# Patient Record
Sex: FEMALE | Race: WHITE | NOT HISPANIC OR LATINO
[De-identification: names, ages, dates, MRNs, and addresses within clinical notes are randomized per-mention and may not be internally consistent; named-entity substitution may affect disease eponyms.]

---

## 2020-08-10 ENCOUNTER — FORM ENCOUNTER (OUTPATIENT)
Age: 30
End: 2020-08-10

## 2020-08-16 ENCOUNTER — FORM ENCOUNTER (OUTPATIENT)
Age: 30
End: 2020-08-16

## 2020-08-16 ENCOUNTER — RESULT REVIEW (OUTPATIENT)
Age: 30
End: 2020-08-16

## 2020-08-17 ENCOUNTER — FORM ENCOUNTER (OUTPATIENT)
Age: 30
End: 2020-08-17

## 2020-08-17 ENCOUNTER — APPOINTMENT (OUTPATIENT)
Dept: OBGYN | Facility: CLINIC | Age: 30
End: 2020-08-17
Payer: COMMERCIAL

## 2020-08-17 ENCOUNTER — TRANSCRIPTION ENCOUNTER (OUTPATIENT)
Age: 30
End: 2020-08-17

## 2020-08-17 PROCEDURE — 76813 OB US NUCHAL MEAS 1 GEST: CPT

## 2020-08-17 PROCEDURE — 76801 OB US < 14 WKS SINGLE FETUS: CPT | Mod: 59

## 2020-08-17 PROCEDURE — 36415 COLL VENOUS BLD VENIPUNCTURE: CPT

## 2020-08-17 PROCEDURE — 0502F SUBSEQUENT PRENATAL CARE: CPT

## 2020-09-09 ENCOUNTER — APPOINTMENT (OUTPATIENT)
Dept: OBGYN | Facility: CLINIC | Age: 30
End: 2020-09-09

## 2020-09-09 ENCOUNTER — APPOINTMENT (OUTPATIENT)
Dept: OBGYN | Facility: CLINIC | Age: 30
End: 2020-09-09
Payer: COMMERCIAL

## 2020-09-09 PROCEDURE — 99213 OFFICE O/P EST LOW 20 MIN: CPT

## 2020-09-23 ENCOUNTER — APPOINTMENT (OUTPATIENT)
Dept: OBGYN | Facility: CLINIC | Age: 30
End: 2020-09-23
Payer: COMMERCIAL

## 2020-09-23 PROCEDURE — 0502F SUBSEQUENT PRENATAL CARE: CPT

## 2020-09-23 PROCEDURE — 36415 COLL VENOUS BLD VENIPUNCTURE: CPT

## 2020-09-28 ENCOUNTER — FORM ENCOUNTER (OUTPATIENT)
Age: 30
End: 2020-09-28

## 2020-10-08 ENCOUNTER — OUTPATIENT (OUTPATIENT)
Dept: OUTPATIENT SERVICES | Age: 30
LOS: 1 days | Discharge: HOME | End: 2020-10-08

## 2020-10-14 ENCOUNTER — APPOINTMENT (OUTPATIENT)
Dept: ANTEPARTUM | Facility: CLINIC | Age: 30
End: 2020-10-14
Payer: COMMERCIAL

## 2020-10-14 ENCOUNTER — ASOB RESULT (OUTPATIENT)
Age: 30
End: 2020-10-14

## 2020-10-14 ENCOUNTER — APPOINTMENT (OUTPATIENT)
Dept: OBGYN | Facility: CLINIC | Age: 30
End: 2020-10-14
Payer: COMMERCIAL

## 2020-10-14 PROBLEM — Z00.00 ENCOUNTER FOR PREVENTIVE HEALTH EXAMINATION: Status: ACTIVE | Noted: 2020-10-14

## 2020-10-14 PROCEDURE — 0502F SUBSEQUENT PRENATAL CARE: CPT

## 2020-10-14 PROCEDURE — 76811 OB US DETAILED SNGL FETUS: CPT

## 2020-10-14 PROCEDURE — 76817 TRANSVAGINAL US OBSTETRIC: CPT | Mod: 59

## 2020-10-15 ENCOUNTER — FORM ENCOUNTER (OUTPATIENT)
Age: 30
End: 2020-10-15

## 2020-10-16 ENCOUNTER — APPOINTMENT (OUTPATIENT)
Dept: PEDIATRIC CARDIOLOGY | Facility: CLINIC | Age: 30
End: 2020-10-16
Payer: COMMERCIAL

## 2020-10-16 PROCEDURE — 93325 DOPPLER ECHO COLOR FLOW MAPG: CPT

## 2020-10-16 PROCEDURE — 76825 ECHO EXAM OF FETAL HEART: CPT

## 2020-10-16 PROCEDURE — 76827 ECHO EXAM OF FETAL HEART: CPT

## 2020-11-04 ENCOUNTER — APPOINTMENT (OUTPATIENT)
Dept: OBGYN | Facility: CLINIC | Age: 30
End: 2020-11-04

## 2020-11-18 ENCOUNTER — APPOINTMENT (OUTPATIENT)
Dept: OBGYN | Facility: CLINIC | Age: 30
End: 2020-11-18
Payer: COMMERCIAL

## 2020-11-18 ENCOUNTER — APPOINTMENT (OUTPATIENT)
Dept: OBGYN | Facility: CLINIC | Age: 30
End: 2020-11-18

## 2020-11-18 PROCEDURE — 76817 TRANSVAGINAL US OBSTETRIC: CPT

## 2020-11-18 PROCEDURE — 76815 OB US LIMITED FETUS(S): CPT

## 2020-11-19 ENCOUNTER — TRANSCRIPTION ENCOUNTER (OUTPATIENT)
Age: 30
End: 2020-11-19

## 2020-11-19 ENCOUNTER — INPATIENT (INPATIENT)
Facility: HOSPITAL | Age: 30
LOS: 2 days | Discharge: HOME | DRG: 832 | End: 2020-11-22
Attending: OBSTETRICS & GYNECOLOGY | Admitting: OBSTETRICS & GYNECOLOGY
Payer: COMMERCIAL

## 2020-11-19 ENCOUNTER — RESULT REVIEW (OUTPATIENT)
Age: 30
End: 2020-11-19

## 2020-11-19 VITALS
SYSTOLIC BLOOD PRESSURE: 107 MMHG | OXYGEN SATURATION: 100 % | RESPIRATION RATE: 17 BRPM | DIASTOLIC BLOOD PRESSURE: 67 MMHG | HEART RATE: 91 BPM | TEMPERATURE: 99 F

## 2020-11-19 DIAGNOSIS — N20.0 CALCULUS OF KIDNEY: ICD-10-CM

## 2020-11-19 DIAGNOSIS — Z34.80 ENCOUNTER FOR SUPERVISION OF OTHER NORMAL PREGNANCY, UNSPECIFIED TRIMESTER: ICD-10-CM

## 2020-11-19 DIAGNOSIS — Z3A.00 WEEKS OF GESTATION OF PREGNANCY NOT SPECIFIED: ICD-10-CM

## 2020-11-19 DIAGNOSIS — O26.899 OTHER SPECIFIED PREGNANCY RELATED CONDITIONS, UNSPECIFIED TRIMESTER: ICD-10-CM

## 2020-11-19 LAB
ANION GAP SERPL CALC-SCNC: 12 MMOL/L — SIGNIFICANT CHANGE UP (ref 5–17)
APPEARANCE UR: ABNORMAL
BASOPHILS # BLD AUTO: 0.02 K/UL — SIGNIFICANT CHANGE UP (ref 0–0.2)
BASOPHILS NFR BLD AUTO: 0.2 % — SIGNIFICANT CHANGE UP (ref 0–2)
BILIRUB UR-MCNC: NEGATIVE — SIGNIFICANT CHANGE UP
BLD GP AB SCN SERPL QL: NEGATIVE — SIGNIFICANT CHANGE UP
BUN SERPL-MCNC: 12 MG/DL — SIGNIFICANT CHANGE UP (ref 7–23)
CALCIUM SERPL-MCNC: 9.1 MG/DL — SIGNIFICANT CHANGE UP (ref 8.4–10.5)
CHLORIDE SERPL-SCNC: 102 MMOL/L — SIGNIFICANT CHANGE UP (ref 96–108)
CO2 SERPL-SCNC: 21 MMOL/L — LOW (ref 22–31)
COLOR SPEC: YELLOW — SIGNIFICANT CHANGE UP
CREAT SERPL-MCNC: 0.55 MG/DL — SIGNIFICANT CHANGE UP (ref 0.5–1.3)
DIFF PNL FLD: ABNORMAL
EOSINOPHIL # BLD AUTO: 0.02 K/UL — SIGNIFICANT CHANGE UP (ref 0–0.5)
EOSINOPHIL NFR BLD AUTO: 0.2 % — SIGNIFICANT CHANGE UP (ref 0–6)
GLUCOSE SERPL-MCNC: 102 MG/DL — HIGH (ref 70–99)
GLUCOSE UR QL: NEGATIVE — SIGNIFICANT CHANGE UP
HCT VFR BLD CALC: 34.1 % — LOW (ref 34.5–45)
HGB BLD-MCNC: 11.5 G/DL — SIGNIFICANT CHANGE UP (ref 11.5–15.5)
IMM GRANULOCYTES NFR BLD AUTO: 0.4 % — SIGNIFICANT CHANGE UP (ref 0–1.5)
KETONES UR-MCNC: ABNORMAL
LEUKOCYTE ESTERASE UR-ACNC: NEGATIVE — SIGNIFICANT CHANGE UP
LYMPHOCYTES # BLD AUTO: 0.83 K/UL — LOW (ref 1–3.3)
LYMPHOCYTES # BLD AUTO: 8.1 % — LOW (ref 13–44)
MCHC RBC-ENTMCNC: 33.2 PG — SIGNIFICANT CHANGE UP (ref 27–34)
MCHC RBC-ENTMCNC: 33.7 GM/DL — SIGNIFICANT CHANGE UP (ref 32–36)
MCV RBC AUTO: 98.6 FL — SIGNIFICANT CHANGE UP (ref 80–100)
MONOCYTES # BLD AUTO: 0.59 K/UL — SIGNIFICANT CHANGE UP (ref 0–0.9)
MONOCYTES NFR BLD AUTO: 5.7 % — SIGNIFICANT CHANGE UP (ref 2–14)
NEUTROPHILS # BLD AUTO: 8.81 K/UL — HIGH (ref 1.8–7.4)
NEUTROPHILS NFR BLD AUTO: 85.4 % — HIGH (ref 43–77)
NITRITE UR-MCNC: NEGATIVE — SIGNIFICANT CHANGE UP
NRBC # BLD: 0 /100 WBCS — SIGNIFICANT CHANGE UP (ref 0–0)
PH UR: 6 — SIGNIFICANT CHANGE UP (ref 5–8)
PLATELET # BLD AUTO: 250 K/UL — SIGNIFICANT CHANGE UP (ref 150–400)
POTASSIUM SERPL-MCNC: 3.9 MMOL/L — SIGNIFICANT CHANGE UP (ref 3.5–5.3)
POTASSIUM SERPL-SCNC: 3.9 MMOL/L — SIGNIFICANT CHANGE UP (ref 3.5–5.3)
PROT UR-MCNC: ABNORMAL
RBC # BLD: 3.46 M/UL — LOW (ref 3.8–5.2)
RBC # FLD: 12.9 % — SIGNIFICANT CHANGE UP (ref 10.3–14.5)
RH IG SCN BLD-IMP: POSITIVE — SIGNIFICANT CHANGE UP
SARS-COV-2 RNA SPEC QL NAA+PROBE: SIGNIFICANT CHANGE UP
SODIUM SERPL-SCNC: 135 MMOL/L — SIGNIFICANT CHANGE UP (ref 135–145)
SP GR SPEC: 1.03 — HIGH (ref 1.01–1.02)
UROBILINOGEN FLD QL: NEGATIVE — SIGNIFICANT CHANGE UP
WBC # BLD: 10.31 K/UL — SIGNIFICANT CHANGE UP (ref 3.8–10.5)
WBC # FLD AUTO: 10.31 K/UL — SIGNIFICANT CHANGE UP (ref 3.8–10.5)

## 2020-11-19 PROCEDURE — 99222 1ST HOSP IP/OBS MODERATE 55: CPT

## 2020-11-19 PROCEDURE — 76770 US EXAM ABDO BACK WALL COMP: CPT | Mod: 26

## 2020-11-19 RX ORDER — MORPHINE SULFATE 50 MG/1
2 CAPSULE, EXTENDED RELEASE ORAL ONCE
Refills: 0 | Status: DISCONTINUED | OUTPATIENT
Start: 2020-11-19 | End: 2020-11-19

## 2020-11-19 RX ORDER — ONDANSETRON 8 MG/1
4 TABLET, FILM COATED ORAL EVERY 6 HOURS
Refills: 0 | Status: DISCONTINUED | OUTPATIENT
Start: 2020-11-19 | End: 2020-11-22

## 2020-11-19 RX ORDER — OXYCODONE HYDROCHLORIDE 5 MG/1
5 TABLET ORAL EVERY 4 HOURS
Refills: 0 | Status: DISCONTINUED | OUTPATIENT
Start: 2020-11-19 | End: 2020-11-20

## 2020-11-19 RX ORDER — FAMOTIDINE 10 MG/ML
20 INJECTION INTRAVENOUS ONCE
Refills: 0 | Status: COMPLETED | OUTPATIENT
Start: 2020-11-19 | End: 2020-11-19

## 2020-11-19 RX ORDER — INFLUENZA VIRUS VACCINE 15; 15; 15; 15 UG/.5ML; UG/.5ML; UG/.5ML; UG/.5ML
0.5 SUSPENSION INTRAMUSCULAR ONCE
Refills: 0 | Status: DISCONTINUED | OUTPATIENT
Start: 2020-11-19 | End: 2020-11-22

## 2020-11-19 RX ORDER — ACETAMINOPHEN 500 MG
1000 TABLET ORAL ONCE
Refills: 0 | Status: COMPLETED | OUTPATIENT
Start: 2020-11-19 | End: 2020-11-19

## 2020-11-19 RX ORDER — HYDROMORPHONE HYDROCHLORIDE 2 MG/ML
0.5 INJECTION INTRAMUSCULAR; INTRAVENOUS; SUBCUTANEOUS ONCE
Refills: 0 | Status: DISCONTINUED | OUTPATIENT
Start: 2020-11-19 | End: 2020-11-19

## 2020-11-19 RX ORDER — SODIUM CHLORIDE 9 MG/ML
1000 INJECTION, SOLUTION INTRAVENOUS
Refills: 0 | Status: DISCONTINUED | OUTPATIENT
Start: 2020-11-19 | End: 2020-11-22

## 2020-11-19 RX ORDER — SODIUM CHLORIDE 9 MG/ML
1000 INJECTION, SOLUTION INTRAVENOUS
Refills: 0 | Status: DISCONTINUED | OUTPATIENT
Start: 2020-11-19 | End: 2020-11-19

## 2020-11-19 RX ORDER — SODIUM CHLORIDE 9 MG/ML
1000 INJECTION, SOLUTION INTRAVENOUS ONCE
Refills: 0 | Status: COMPLETED | OUTPATIENT
Start: 2020-11-19 | End: 2020-11-19

## 2020-11-19 RX ADMIN — OXYCODONE HYDROCHLORIDE 5 MILLIGRAM(S): 5 TABLET ORAL at 21:03

## 2020-11-19 RX ADMIN — FAMOTIDINE 20 MILLIGRAM(S): 10 INJECTION INTRAVENOUS at 15:35

## 2020-11-19 RX ADMIN — SODIUM CHLORIDE 2000 MILLILITER(S): 9 INJECTION, SOLUTION INTRAVENOUS at 07:09

## 2020-11-19 RX ADMIN — SODIUM CHLORIDE 1000 MILLILITER(S): 9 INJECTION, SOLUTION INTRAVENOUS at 04:00

## 2020-11-19 RX ADMIN — ONDANSETRON 4 MILLIGRAM(S): 8 TABLET, FILM COATED ORAL at 20:33

## 2020-11-19 RX ADMIN — Medication 400 MILLIGRAM(S): at 10:47

## 2020-11-19 RX ADMIN — SODIUM CHLORIDE 250 MILLILITER(S): 9 INJECTION, SOLUTION INTRAVENOUS at 08:03

## 2020-11-19 RX ADMIN — ONDANSETRON 4 MILLIGRAM(S): 8 TABLET, FILM COATED ORAL at 11:31

## 2020-11-19 RX ADMIN — MORPHINE SULFATE 2 MILLIGRAM(S): 50 CAPSULE, EXTENDED RELEASE ORAL at 08:18

## 2020-11-19 RX ADMIN — MORPHINE SULFATE 2 MILLIGRAM(S): 50 CAPSULE, EXTENDED RELEASE ORAL at 15:37

## 2020-11-19 RX ADMIN — MORPHINE SULFATE 2 MILLIGRAM(S): 50 CAPSULE, EXTENDED RELEASE ORAL at 08:04

## 2020-11-19 RX ADMIN — HYDROMORPHONE HYDROCHLORIDE 0.5 MILLIGRAM(S): 2 INJECTION INTRAMUSCULAR; INTRAVENOUS; SUBCUTANEOUS at 22:17

## 2020-11-19 RX ADMIN — HYDROMORPHONE HYDROCHLORIDE 0.5 MILLIGRAM(S): 2 INJECTION INTRAMUSCULAR; INTRAVENOUS; SUBCUTANEOUS at 22:47

## 2020-11-19 RX ADMIN — MORPHINE SULFATE 2 MILLIGRAM(S): 50 CAPSULE, EXTENDED RELEASE ORAL at 15:35

## 2020-11-19 RX ADMIN — MORPHINE SULFATE 2 MILLIGRAM(S): 50 CAPSULE, EXTENDED RELEASE ORAL at 16:30

## 2020-11-19 RX ADMIN — Medication 400 MILLIGRAM(S): at 04:17

## 2020-11-19 RX ADMIN — OXYCODONE HYDROCHLORIDE 5 MILLIGRAM(S): 5 TABLET ORAL at 21:35

## 2020-11-19 RX ADMIN — SODIUM CHLORIDE 250 MILLILITER(S): 9 INJECTION, SOLUTION INTRAVENOUS at 21:37

## 2020-11-19 RX ADMIN — OXYCODONE HYDROCHLORIDE 5 MILLIGRAM(S): 5 TABLET ORAL at 20:33

## 2020-11-19 RX ADMIN — MORPHINE SULFATE 2 MILLIGRAM(S): 50 CAPSULE, EXTENDED RELEASE ORAL at 14:38

## 2020-11-19 RX ADMIN — Medication 1000 MILLIGRAM(S): at 18:55

## 2020-11-19 RX ADMIN — Medication 1000 MILLIGRAM(S): at 11:15

## 2020-11-19 RX ADMIN — Medication 400 MILLIGRAM(S): at 18:06

## 2020-11-19 RX ADMIN — OXYCODONE HYDROCHLORIDE 5 MILLIGRAM(S): 5 TABLET ORAL at 22:05

## 2020-11-19 NOTE — DISCHARGE NOTE ANTEPARTUM - MEDICATION SUMMARY - MEDICATIONS TO TAKE
I will START or STAY ON the medications listed below when I get home from the hospital:    acetaminophen 325 mg oral tablet  -- 2 tab(s) by mouth every 6 hours   for moderate pain   -- Indication: For Nephrolithiasis    oxyCODONE 5 mg oral tablet  -- 1 tab(s) by mouth every 4 hours, As Needed for Severe Pain MDD:2  -- Caution federal law prohibits the transfer of this drug to any person other  than the person for whom it was prescribed.  It is very important that you take or use this exactly as directed.  Do not skip doses or discontinue unless directed by your doctor.  May cause drowsiness or dizziness.  This prescription cannot be refilled.  Using more of this medication than prescribed may cause serious breathing problems.    -- Indication: For Nephrolithiasis    senna 8.6 mg oral tablet  -- 1 tab(s) by mouth 2 times a day for constipation, hold for loose stools   -- Indication: For Pregnancy

## 2020-11-19 NOTE — DISCHARGE NOTE ANTEPARTUM - PATIENT PORTAL LINK FT
You can access the FollowMyHealth Patient Portal offered by [facility] by registering at the following website: [email or web address]. By joining [facility]’s FollowMyHealth portal, you will also be able to view your health information using other applications (apps) compatible with our system.

## 2020-11-19 NOTE — OB PROVIDER TRIAGE NOTE - HISTORY OF PRESENT ILLNESS
R3 OB H+P    EDITA MARTINO    30y  at 25w presents with constant pelvic pain x 1d. Patient was seen in the office today--CL 3cm and cervix CLH. Pain resolved and then recurred at 1030p tonight. Pain is constant, sharp, worse with urination.   Patient endorses three bowel movements this evening with the last being watery.  No fevers or chills. No palpitations, chest pain, or SOB.  No vaginal bleeding, loss of fluid. Reports good fetal movement.     PNC: uncomplicated  ObH: current  GynH: No fibroids, endometriosis, STIs, or abn pap. +PCOS  PMH: denies  PSH: denies  Meds: PNV  All: No Known Allergies  SocH: No t/e/d.   PsychH: No h/o anxiety/depression. No PP depression    REVIEW OF SYSTEMS      General: Denies fever, chills, weakness	    Skin/Breast: Denies breast pain  	  Respiratory and Thorax: Denies SOB, denies cough  	  Cardiovascular: Denies chest pain or palpitations    Gastrointestinal: Denies nausea/vomiting.     Genitourinary: Denies increased urinary frequency, urgency	    Constitutional, Cardiovascular, Respiratory, Gastrointestinal, Genitourinary, Musculoskeletal, Neurological, and Integumentary review of systems are normal except as noted     PE    T(C): 37.0 (20 @ 02:50), Max: 37 (20 @ 02:46)  HR: 90 (20 @ 03:42) (89 - 97)  BP: 107/67 (20 @ 02:50) (107/67 - 107/67)  RR: 17 (20 @ 02:50) (17 - 17)  SpO2: 99% (20 @ 03:42) (99% - 100%)    Gen: NAD  CV: RRR  Resp: CTABL  Back: Mild RCVAT, also TTP over R costal border  Abd: soft, gravid. +suprapubic tenderness  Ext: bl symmetric, nontender  SVE: CLH  TVUS: CL 4.2-4.3  EFM: 145/mod variability/+accels/-decels  TOCO: +irritability

## 2020-11-19 NOTE — OB PROVIDER TRIAGE NOTE - NSOBPROVIDERNOTE_OBGYN_ALL_OB_FT
A/P 30y  at 25w with abdominal pain. Low suspicion for PTL given long cervix and no change in cervical exam. Will fluid resuscitate, treat pain, and assess for UTI.     - UA/UCx  - 1L LR  - CBC/BMP  - COVID PCR    Kayleigh Cheung PGY3

## 2020-11-19 NOTE — DISCHARGE NOTE ANTEPARTUM - CARE PLAN
Principal Discharge DX:	Nephrolithiasis  Goal:	Well being  Assessment and plan of treatment:	Please follow up with your OB doctor this week.  Return to L&D if you experience contractions every 3-5 minutes, leaking of fluid, vaginal bleeding like a period, or less than 5 fetal movements per hour.   Principal Discharge DX:	Nephrolithiasis  Goal:	Well being  Assessment and plan of treatment:	Please follow up with your OB doctor this week.  Return to L&D if you experience contractions every 3-5 minutes, leaking of fluid, vaginal bleeding like a period, or less than 5 fetal movements per hour.  You may take Tylenol for mild-moderate pain. You may take Oxycodone as needed for severe pain  Goal:	Prenatal care  Assessment and plan of treatment:	Continue your prenatal vitamins  Call the office if you experience painful contractions, leakage of fluid, or vaginal bleeding

## 2020-11-19 NOTE — OB PROVIDER H&P - ASSESSMENT
A/P 30y  at 25w with R flank pain with radiation to RLQ. Initial relief with Tylenol, now with worsening flank pain rated 9/10. Low suspicion for PTL given long cervix and no change in cervical exam. UA with trace blood and moderate calcium oxalate crystals. Renal sono with mild to moderate R hydronephrosis, right ureteral jet not visualized. Clinical picture suspicious for nephrolithiasis. Low concern for infected stone given pt is afebrile with WBC 10. Will admit to antepartum for pain management and IV hydration.    #Nephrolithiasis   -s/p 2L LR, mIVF@250  -Strain all voids  -Morphine 2mg for pain, reevaluate as needed     #Fetal Well Being  -Cat 1 tracing, fetal status reassuring  -Continuous monitoring during 1st dose of morphine. If tracing remains Cat 1, will transition to NST BID    #Maternal well-being  -Diet as tolerated  -Continue to monitor VS  -Continue PNV  -SCDs for DVT ppx       D./w Dr. Jaden madrid pgy3

## 2020-11-19 NOTE — DISCHARGE NOTE ANTEPARTUM - HOSPITAL COURSE
Patient was admitted with R flank pain, suspicious for nephrolithiasis. Renal ultrasound should mild to moderate right hydronephrosis unable to visualized R ureteral jet. Patient was admitted for pain management and fluid hydration. Pt was discharged on HD*** with excellent pain control. Fetal status reassuring Ms Green is a 31yo  at 25w3d EGA admitted with suspected nephrolithiasis. Workup notable for bilateral mild L and mild-moderate R hydronephrosis cw physiologic changes in pregnancy. CT A/P notable for R calyceal rupture, appreciate Urology recs and likely related to recent stone passage. Patient continues to improve with primary goal now - pain management. Pt hypotensive and tachycardic on () likely 2/2 narcotic use, now resolved. EKG with sinus tachycardia. No longer having palpitations. With regards to her clinical course, patient has been r/o for pyelonephritis, ovarian torsion, and appendicitis. She continues to improve. NST reactive with reassuring fetal status. Patient stable for safe discharge home.

## 2020-11-19 NOTE — DISCHARGE NOTE ANTEPARTUM - PLAN OF CARE
Well being Please follow up with your OB doctor this week.  Return to L&D if you experience contractions every 3-5 minutes, leaking of fluid, vaginal bleeding like a period, or less than 5 fetal movements per hour. Please follow up with your OB doctor this week.  Return to L&D if you experience contractions every 3-5 minutes, leaking of fluid, vaginal bleeding like a period, or less than 5 fetal movements per hour.  You may take Tylenol for mild-moderate pain. You may take Oxycodone as needed for severe pain Prenatal care Continue your prenatal vitamins  Call the office if you experience painful contractions, leakage of fluid, or vaginal bleeding

## 2020-11-19 NOTE — OB PROVIDER H&P - NSHPLABSRESULTS_GEN_ALL_CORE
Urinalysis (11.19.20 @ 04:21)   pH Urine: 6.0   Glucose Qualitative, Urine: Negative   Blood, Urine: Trace   Color: Yellow   Urine Appearance: Slightly Turbid   Bilirubin: Negative   Ketone - Urine: Large   Specific Gravity: 1.029   Protein, Urine: Trace   Urobilinogen: Negative   Nitrite: Negative   Leukocyte Esterase Concentration: Negative     < from: US Kidney and Bladder (11.19.20 @ 06:15) >    EXAM:  US KIDNEYS AND BLADDER                            PROCEDURE DATE:  11/19/2020            INTERPRETATION:  CLINICAL INFORMATION: Flank pain and hematuria. 25 weeks pregnant. Evaluate for hydronephrosis or stone.    COMPARISON: None available.    TECHNIQUE: Sonography of the kidneys and bladder.    FINDINGS:    Slightly limited exam secondary to overlying bowel gas.    Right kidney: 13.8 cm. Mild-moderate hydronephrosis. No renal calculi or renal mass identified.    Left kidney: 12.0 cm. Mild hydronephrosis. No renal calculi or renal mass identified.    Urinary bladder: Within normal limits. The left ureteral jet is visualized. The right ureteral jet was not visualized.    IMPRESSION:    Limited exam.    Mild is moderate right hydronephrosis and mild left hydronephrosis. No renal calculi identified, however the right ureteral jet is not visualized.    < end of copied text >

## 2020-11-19 NOTE — DISCHARGE NOTE ANTEPARTUM - CARE PROVIDER_API CALL
Priyanka Ayala  MATERNAL/FETAL MEDICINE  [address] [place] [address], [facility] [address]  [place] [address]  Phone: [phone]  Fax: [phone]  Follow Up Time:

## 2020-11-19 NOTE — OB PROVIDER H&P - NSHPPHYSICALEXAM_GEN_ALL_CORE
Gen: NAD  CV: RRR  Resp: CTABL  Back: Mild RCVAT, also TTP over R costal border  Abd: soft, gravid. +suprapubic tenderness  Ext: bl symmetric, nontender  SVE: CLH  TVUS: CL 4.2-4.3  EFM: 145/mod variability/+accels/-decels  TOCO: acontractile

## 2020-11-19 NOTE — OB PROVIDER H&P - HISTORY OF PRESENT ILLNESS
R3 OB H+P    EDITA MARTINO  30y  at 25w presents with constant pelvic pain x 1d. Patient was seen in the office today--CL 3cm and cervix CLH. Pain resolved and then recurred at 1030p tonight. Pain is constant, sharp, worse with urination.   Patient endorses three bowel movements this evening with the last being watery.  No fevers or chills. No palpitations, chest pain, or SOB.  No vaginal bleeding, loss of fluid. Reports good fetal movement.     Pt reevaluated at 730am. Received Tylenol at 4am with good relief. Pt now reports 9/10 right flank pain. Unable to sit still. Pt reports pain radiates to RLQ. Denies nausea/vomiting.       PNC: uncomplicated  ObH: current  GynH: No fibroids, endometriosis, STIs, or abn pap. +PCOS  PMH: denies  PSH: denies  Meds: PNV  All: No Known Allergies  SocH: No t/e/d.   PsychH: No h/o anxiety/depression. No PP depression    REVIEW OF SYSTEMS      General: Denies fever, chills, weakness	    Skin/Breast: Denies breast pain  	  Respiratory and Thorax: Denies SOB, denies cough  	  Cardiovascular: Denies chest pain or palpitations    Gastrointestinal: Denies nausea/vomiting.     Genitourinary: Denies increased urinary frequency, urgency	    Constitutional, Cardiovascular, Respiratory, Gastrointestinal, Genitourinary, Musculoskeletal, Neurological, and Integumentary review of systems are normal except as noted     PE    T(C): 37.0 (20 @ 02:50), Max: 37 (20 @ 02:46)  HR: 90 (20 @ 03:42) (89 - 97)  BP: 107/67 (20 @ 02:50) (107/67 - 107/67)  RR: 17 (20 @ 02:50) (17 - 17)  SpO2: 99% (20 @ 03:42) (99% - 100%)

## 2020-11-20 LAB
CULTURE RESULTS: SIGNIFICANT CHANGE UP
SARS-COV-2 IGG SERPL QL IA: NEGATIVE — SIGNIFICANT CHANGE UP
SARS-COV-2 IGM SERPL IA-ACNC: <0.1 INDEX — SIGNIFICANT CHANGE UP
SPECIMEN SOURCE: SIGNIFICANT CHANGE UP

## 2020-11-20 PROCEDURE — 99221 1ST HOSP IP/OBS SF/LOW 40: CPT

## 2020-11-20 PROCEDURE — 99233 SBSQ HOSP IP/OBS HIGH 50: CPT

## 2020-11-20 RX ORDER — CALCIUM CARBONATE 500(1250)
2 TABLET ORAL EVERY 6 HOURS
Refills: 0 | Status: DISCONTINUED | OUTPATIENT
Start: 2020-11-20 | End: 2020-11-22

## 2020-11-20 RX ORDER — HYDROMORPHONE HYDROCHLORIDE 2 MG/ML
0.5 INJECTION INTRAMUSCULAR; INTRAVENOUS; SUBCUTANEOUS ONCE
Refills: 0 | Status: DISCONTINUED | OUTPATIENT
Start: 2020-11-20 | End: 2020-11-20

## 2020-11-20 RX ORDER — CEPHALEXIN 500 MG
1 CAPSULE ORAL
Qty: 48 | Refills: 0
Start: 2020-11-20 | End: 2020-12-01

## 2020-11-20 RX ORDER — TAMSULOSIN HYDROCHLORIDE 0.4 MG/1
0.4 CAPSULE ORAL AT BEDTIME
Refills: 0 | Status: DISCONTINUED | OUTPATIENT
Start: 2020-11-20 | End: 2020-11-22

## 2020-11-20 RX ORDER — FAMOTIDINE 10 MG/ML
20 INJECTION INTRAVENOUS DAILY
Refills: 0 | Status: DISCONTINUED | OUTPATIENT
Start: 2020-11-20 | End: 2020-11-22

## 2020-11-20 RX ORDER — HYDROMORPHONE HYDROCHLORIDE 2 MG/ML
2 INJECTION INTRAMUSCULAR; INTRAVENOUS; SUBCUTANEOUS
Refills: 0 | Status: DISCONTINUED | OUTPATIENT
Start: 2020-11-20 | End: 2020-11-22

## 2020-11-20 RX ORDER — ACETAMINOPHEN 500 MG
1000 TABLET ORAL EVERY 6 HOURS
Refills: 0 | Status: DISCONTINUED | OUTPATIENT
Start: 2020-11-20 | End: 2020-11-21

## 2020-11-20 RX ORDER — HYDROMORPHONE HYDROCHLORIDE 2 MG/ML
1 INJECTION INTRAMUSCULAR; INTRAVENOUS; SUBCUTANEOUS EVERY 4 HOURS
Refills: 0 | Status: DISCONTINUED | OUTPATIENT
Start: 2020-11-20 | End: 2020-11-20

## 2020-11-20 RX ORDER — HYDROMORPHONE HYDROCHLORIDE 2 MG/ML
1 INJECTION INTRAMUSCULAR; INTRAVENOUS; SUBCUTANEOUS ONCE
Refills: 0 | Status: DISCONTINUED | OUTPATIENT
Start: 2020-11-20 | End: 2020-11-20

## 2020-11-20 RX ADMIN — ONDANSETRON 4 MILLIGRAM(S): 8 TABLET, FILM COATED ORAL at 01:47

## 2020-11-20 RX ADMIN — HYDROMORPHONE HYDROCHLORIDE 1 MILLIGRAM(S): 2 INJECTION INTRAMUSCULAR; INTRAVENOUS; SUBCUTANEOUS at 10:45

## 2020-11-20 RX ADMIN — HYDROMORPHONE HYDROCHLORIDE 2 MILLIGRAM(S): 2 INJECTION INTRAMUSCULAR; INTRAVENOUS; SUBCUTANEOUS at 14:38

## 2020-11-20 RX ADMIN — FAMOTIDINE 20 MILLIGRAM(S): 10 INJECTION INTRAVENOUS at 18:06

## 2020-11-20 RX ADMIN — HYDROMORPHONE HYDROCHLORIDE 2 MILLIGRAM(S): 2 INJECTION INTRAMUSCULAR; INTRAVENOUS; SUBCUTANEOUS at 15:30

## 2020-11-20 RX ADMIN — HYDROMORPHONE HYDROCHLORIDE 2 MILLIGRAM(S): 2 INJECTION INTRAMUSCULAR; INTRAVENOUS; SUBCUTANEOUS at 18:06

## 2020-11-20 RX ADMIN — HYDROMORPHONE HYDROCHLORIDE 2 MILLIGRAM(S): 2 INJECTION INTRAMUSCULAR; INTRAVENOUS; SUBCUTANEOUS at 19:00

## 2020-11-20 RX ADMIN — Medication 2 TABLET(S): at 15:33

## 2020-11-20 RX ADMIN — HYDROMORPHONE HYDROCHLORIDE 0.5 MILLIGRAM(S): 2 INJECTION INTRAMUSCULAR; INTRAVENOUS; SUBCUTANEOUS at 01:48

## 2020-11-20 RX ADMIN — HYDROMORPHONE HYDROCHLORIDE 1 MILLIGRAM(S): 2 INJECTION INTRAMUSCULAR; INTRAVENOUS; SUBCUTANEOUS at 09:57

## 2020-11-20 RX ADMIN — HYDROMORPHONE HYDROCHLORIDE 0.5 MILLIGRAM(S): 2 INJECTION INTRAMUSCULAR; INTRAVENOUS; SUBCUTANEOUS at 05:54

## 2020-11-20 RX ADMIN — TAMSULOSIN HYDROCHLORIDE 0.4 MILLIGRAM(S): 0.4 CAPSULE ORAL at 21:43

## 2020-11-20 RX ADMIN — HYDROMORPHONE HYDROCHLORIDE 2 MILLIGRAM(S): 2 INJECTION INTRAMUSCULAR; INTRAVENOUS; SUBCUTANEOUS at 21:02

## 2020-11-20 RX ADMIN — HYDROMORPHONE HYDROCHLORIDE 0.5 MILLIGRAM(S): 2 INJECTION INTRAMUSCULAR; INTRAVENOUS; SUBCUTANEOUS at 02:20

## 2020-11-20 RX ADMIN — Medication 2 TABLET(S): at 21:44

## 2020-11-20 RX ADMIN — Medication 1 TABLET(S): at 21:43

## 2020-11-20 NOTE — CONSULT NOTE ADULT - SUBJECTIVE AND OBJECTIVE BOX
HPI:  31y/o female 25 weeks pregnant admitted yesterday with R flank pain with radiation to RLQ x 2 days. Renal sono showed mild to moderate R hydronephrosis, right ureteral jet not visualized.  UA with trace blood and moderate calcium oxalate crystals. Unable to transition to PO pain medication with oxycodone, better pain controlled with dilaudid 0.5mg. She admits to one episode at home of vomiting, multiple episode yesterday after receiving morphine. Baseline urinary frequency since pregnancy. She denies fever, chills, dysuria, urgency or gross hematuria.     PAST MEDICAL & SURGICAL HISTORY:  PCOS (polycystic ovarian syndrome)    Miscarriage    FAMILY HISTORY:    SOCIAL HISTORY:   Tobacco hx:  MEDICATIONS  (STANDING):  influenza   Vaccine 0.5 milliLiter(s) IntraMuscular once  lactated ringers. 1000 milliLiter(s) (250 mL/Hr) IV Continuous <Continuous>  tamsulosin 0.4 milliGRAM(s) Oral at bedtime    MEDICATIONS  (PRN):  HYDROmorphone  Injectable 1 milliGRAM(s) IV Push every 4 hours PRN Severe Pain (7 - 10)  ondansetron Injectable 4 milliGRAM(s) IV Push every 6 hours PRN Nausea and/or Vomiting  oxyCODONE    IR 5 milliGRAM(s) Oral every 4 hours PRN Severe Pain (7 - 10)  oxyCODONE    IR 5 milliGRAM(s) Oral every 4 hours PRN Severe Pain (7 - 10)    Allergies    No Known Allergies    Intolerances    REVIEW OF SYSTEMS: Pertinent positives and negatives as stated in HPI, otherwise negative    Vital signs  T(C): 36.7 (20 @ 09:25), Max: 37 (20 @ 05:23)  HR: 106 (20 @ 10:30)  BP: 104/72 (20 @ 10:30)  SpO2: 96% (20 @ 10:30)     Physical Exam  Gen: NAD  Abd: Soft, NT, ND    LABS:     @ 04:21    WBC 10.31 / Hct 34.1  / SCr 0.55         135  |  102  |  12  ----------------------------<  102<H>  3.9   |  21<L>  |  0.55    Ca    9.1      2020 04:21      Urinalysis Basic - ( 2020 04:21 )    Color: Yellow / Appearance: Slightly Turbid / S.029 / pH: x  Gluc: x / Ketone: Large  / Bili: Negative / Urobili: Negative   Blood: x / Protein: Trace / Nitrite: Negative   Leuk Esterase: Negative / RBC: 4 /hpf / WBC 3 /HPF   Sq Epi: x / Non Sq Epi: 3 / Bacteria: Few    Urine Cx: pending      Radiology:    u< from: US Kidney and Bladder (20 @ 06:15) >  Limited exam.    Mild is moderate right hydronephrosis and mild left hydronephrosis. No renal calculi identified, however the right ureteral jet is not visualized.      < end of copied text >   HPI:  29y/o female 25 weeks pregnant admitted yesterday with R flank pain with radiation to RLQ x 2 days. Renal sono showed mild to moderate R hydronephrosis, right ureteral jet not visualized.  UA with trace blood and moderate calcium oxalate crystals. Unable to transition to PO pain medication with oxycodone, better pain controlled with dilaudid 0.5mg. She admits to one episode at home of vomiting, multiple episode yesterday after receiving morphine. Baseline urinary frequency since pregnancy. She denies fever, chills, dysuria, urgency or gross hematuria.     PAST MEDICAL & SURGICAL HISTORY:  PCOS (polycystic ovarian syndrome)    Miscarriage    FAMILY HISTORY:    SOCIAL HISTORY:   Tobacco hx:  MEDICATIONS  (STANDING):  influenza   Vaccine 0.5 milliLiter(s) IntraMuscular once  lactated ringers. 1000 milliLiter(s) (250 mL/Hr) IV Continuous <Continuous>  tamsulosin 0.4 milliGRAM(s) Oral at bedtime    MEDICATIONS  (PRN):  HYDROmorphone  Injectable 1 milliGRAM(s) IV Push every 4 hours PRN Severe Pain (7 - 10)  ondansetron Injectable 4 milliGRAM(s) IV Push every 6 hours PRN Nausea and/or Vomiting  oxyCODONE    IR 5 milliGRAM(s) Oral every 4 hours PRN Severe Pain (7 - 10)  oxyCODONE    IR 5 milliGRAM(s) Oral every 4 hours PRN Severe Pain (7 - 10)    Allergies    No Known Allergies    Intolerances    REVIEW OF SYSTEMS: Pertinent positives and negatives as stated in HPI, otherwise negative    Vital signs  T(C): 36.7 (20 @ 09:25), Max: 37 (20 @ 05:23)  HR: 106 (20 @ 10:30)  BP: 104/72 (20 @ 10:30)  SpO2: 96% (20 @ 10:30)     Physical Exam  Gen: NAD  Abd: Soft, NT, ND    LABS:     @ 04:21    WBC 10.31 / Hct 34.1  / SCr 0.55         135  |  102  |  12  ----------------------------<  102<H>  3.9   |  21<L>  |  0.55    Ca    9.1      2020 04:21      Urinalysis Basic - ( 2020 04:21 )    Color: Yellow / Appearance: Slightly Turbid / S.029 / pH: x  Gluc: x / Ketone: Large  / Bili: Negative / Urobili: Negative   Blood: x / Protein: Trace / Nitrite: Negative   Leuk Esterase: Negative / RBC: 4 /hpf / WBC 3 /HPF   Sq Epi: x / Non Sq Epi: 3 / Bacteria: Few    Urine Cx: normal  alaina    Radiology:    u< from: US Kidney and Bladder (20 @ 06:15) >  Limited exam.    Mild is moderate right hydronephrosis and mild left hydronephrosis. No renal calculi identified, however the right ureteral jet is not visualized.      < end of copied text >   HPI:  31y/o female 25 weeks pregnant admitted yesterday with R flank pain with radiation to RLQ x 2 days. Renal sono showed mild to moderate R hydronephrosis, right ureteral jet not visualized.  UA with trace blood and moderate calcium oxalate crystals. Unable to transition to PO pain medication with oxycodone, better pain controlled with dilaudid 0.5mg. She admits to one episode at home of vomiting, multiple episode yesterday after receiving morphine. Baseline urinary frequency since pregnancy. She denies fever, chills, dysuria, urgency or gross hematuria.     PAST MEDICAL & SURGICAL HISTORY:  PCOS (polycystic ovarian syndrome)    Miscarriage    FAMILY HISTORY:    SOCIAL HISTORY:   Tobacco hx:  MEDICATIONS  (STANDING):  influenza   Vaccine 0.5 milliLiter(s) IntraMuscular once  lactated ringers. 1000 milliLiter(s) (250 mL/Hr) IV Continuous <Continuous>  tamsulosin 0.4 milliGRAM(s) Oral at bedtime    MEDICATIONS  (PRN):  HYDROmorphone  Injectable 1 milliGRAM(s) IV Push every 4 hours PRN Severe Pain (7 - 10)  ondansetron Injectable 4 milliGRAM(s) IV Push every 6 hours PRN Nausea and/or Vomiting  oxyCODONE    IR 5 milliGRAM(s) Oral every 4 hours PRN Severe Pain (7 - 10)  oxyCODONE    IR 5 milliGRAM(s) Oral every 4 hours PRN Severe Pain (7 - 10)    Allergies    No Known Allergies    Intolerances    REVIEW OF SYSTEMS: Pertinent positives and negatives as stated in HPI, otherwise negative    Vital signs  T(C): 36.7 (20 @ 09:25), Max: 37 (20 @ 05:23)  HR: 106 (20 @ 10:30)  BP: 104/72 (20 @ 10:30)  SpO2: 96% (20 @ 10:30)     Physical Exam  Gen: NAD  Abd: Soft, NT, ND  resp wnl  psych mildly anxious  ms; no focal defect, grossly moving all  neuro; no focal defect  gravid uterus    LABS:     @ 04:21    WBC 10.31 / Hct 34.1  / SCr 0.55         135  |  102  |  12  ----------------------------<  102<H>  3.9   |  21<L>  |  0.55    Ca    9.1      2020 04:21      Urinalysis Basic - ( 2020 04:21 )    Color: Yellow / Appearance: Slightly Turbid / S.029 / pH: x  Gluc: x / Ketone: Large  / Bili: Negative / Urobili: Negative   Blood: x / Protein: Trace / Nitrite: Negative   Leuk Esterase: Negative / RBC: 4 /hpf / WBC 3 /HPF   Sq Epi: x / Non Sq Epi: 3 / Bacteria: Few    Urine Cx: normal  alaina    Radiology:    u< from: US Kidney and Bladder (.20 @ 06:15) >  Limited exam.    Mild is moderate right hydronephrosis and mild left hydronephrosis. No renal calculi identified, however the right ureteral jet is not visualized.      < end of copied text >

## 2020-11-20 NOTE — PROVIDER CONTACT NOTE (OTHER) - SITUATION
Pt. continues to have right flank pain but pain is now radiating around to abdomen. Pt. states pain scale is same in both flank and abdomen 8/10.

## 2020-11-20 NOTE — PROGRESS NOTE ADULT - SUBJECTIVE AND OBJECTIVE BOX
R3 Antepartum Note, HD#2    Interval events: Pt given oxycodone 5mg x2 overnight with minimal relief, transitioned to Dilaudid 0.5mg. Reports better pain relief and less nausea with Dilaudid.    Patient seen and examined at bedside, continues to report right flank pain, last pain medication received 30 minutes ago.  Pt reports +FM, denies LOF, VB, ctx, HA, epigastric pain, blurred vision, CP, SOB, N/V, fevers, and chills.      Vital Signs Last 24 Hours  T(C): 37 (11-20-20 @ 05:23), Max: 37.0 (11-19-20 @ 08:14)  HR: 104 (11-20-20 @ 05:23) (68 - 117)  BP: 108/69 (11-20-20 @ 05:23) (90/54 - 112/76)  RR: 18 (11-20-20 @ 05:23) (18 - 20)  SpO2: 98% (11-20-20 @ 05:23) (96% - 100%)    Physical Exam:  General: NAD  Abdomen: Soft, non-tender, gravid  Ext: No pain or swelling    NST reactive overnight    Labs:             11.5   10.31 )-----------( 250      ( 11-19 @ 04:21 )             34.1     11-19 @ 04:21    135  |  102  |  12  ----------------------------<  102  3.9   |  21  |  0.55    Ca    9.1      11-19 @ 04:21          MEDICATIONS  (STANDING):  influenza   Vaccine 0.5 milliLiter(s) IntraMuscular once  lactated ringers. 1000 milliLiter(s) (250 mL/Hr) IV Continuous <Continuous>    MEDICATIONS  (PRN):  ondansetron Injectable 4 milliGRAM(s) IV Push every 6 hours PRN Nausea and/or Vomiting  oxyCODONE    IR 5 milliGRAM(s) Oral every 4 hours PRN Severe Pain (7 - 10)  oxyCODONE    IR 5 milliGRAM(s) Oral every 4 hours PRN Severe Pain (7 - 10)

## 2020-11-20 NOTE — CONSULT NOTE ADULT - ATTENDING COMMENTS
pt seen and examined  agree with above  CT likely to be definitive given cont pain, hydro on US, to allow optimal planning

## 2020-11-20 NOTE — PROVIDER CONTACT NOTE (OTHER) - ASSESSMENT
Pt denies any chest pain or palpitations.  Pt stated "I feels SOB but feels its from stomach bloating".  Pt also states she has been drinking plenty of water

## 2020-11-20 NOTE — PROVIDER CONTACT NOTE (OTHER) - ASSESSMENT
Pt. states pain scale is same in both flank and abdomen 8/10. Abdomen soft, pt. feels fetal motion. Pt. has been borderline tachycardic throughout day.

## 2020-11-20 NOTE — CONSULT NOTE ADULT - ASSESSMENT
31y/o female 25 weeks pregnant with right renal colic from presumed calculi     Discussed options of medical expulsive therapy vs stent and nephrostomy tube   After discussion pt agrees to observation and trial of passage  Continue IV fluids  Start Flomax 0.4mg  Strain urine  Follow up urine culture  Would recommend getting a CT scan if remains with intractable pain to confirm presence of ureteral stone prior to any surgical intervention        29y/o female 25 weeks pregnant with right renal colic from presumed calculi.  Afebrile.  Urine culture normal  alaina.    Discussed options of medical expulsive therapy vs stent/nephrostomy tube, vs URS  After discussion pt agrees to observation and trial of passage  Favor conservative treatment given patient is non-septic, AVSS, afebrile, urine culture NGUF  Continue IV fluids  Start Flomax 0.4mg  Strain urine  Would recommend CT scan, if primary team in agreement with risks/benefits ratio, if remains with intractable pain to confirm presence of ureteral stone prior to any surgical intervention; could consider ureteroscopy with definitive stone management should urolithiasis be confirmed  Discussed with attending on call, Dr. Hoenig

## 2020-11-21 LAB
ALBUMIN SERPL ELPH-MCNC: 3 G/DL — LOW (ref 3.3–5)
ALP SERPL-CCNC: 63 U/L — SIGNIFICANT CHANGE UP (ref 40–120)
ALT FLD-CCNC: 16 U/L — SIGNIFICANT CHANGE UP (ref 10–45)
ANION GAP SERPL CALC-SCNC: 12 MMOL/L — SIGNIFICANT CHANGE UP (ref 5–17)
AST SERPL-CCNC: 15 U/L — SIGNIFICANT CHANGE UP (ref 10–40)
BASOPHILS # BLD AUTO: 0.02 K/UL — SIGNIFICANT CHANGE UP (ref 0–0.2)
BASOPHILS NFR BLD AUTO: 0.2 % — SIGNIFICANT CHANGE UP (ref 0–2)
BILIRUB SERPL-MCNC: 0.4 MG/DL — SIGNIFICANT CHANGE UP (ref 0.2–1.2)
BUN SERPL-MCNC: 4 MG/DL — LOW (ref 7–23)
CALCIUM SERPL-MCNC: 8.8 MG/DL — SIGNIFICANT CHANGE UP (ref 8.4–10.5)
CHLORIDE SERPL-SCNC: 104 MMOL/L — SIGNIFICANT CHANGE UP (ref 96–108)
CO2 SERPL-SCNC: 22 MMOL/L — SIGNIFICANT CHANGE UP (ref 22–31)
CREAT SERPL-MCNC: 0.58 MG/DL — SIGNIFICANT CHANGE UP (ref 0.5–1.3)
EOSINOPHIL # BLD AUTO: 0.05 K/UL — SIGNIFICANT CHANGE UP (ref 0–0.5)
EOSINOPHIL NFR BLD AUTO: 0.5 % — SIGNIFICANT CHANGE UP (ref 0–6)
GLUCOSE SERPL-MCNC: 98 MG/DL — SIGNIFICANT CHANGE UP (ref 70–99)
HCT VFR BLD CALC: 31.1 % — LOW (ref 34.5–45)
HGB BLD-MCNC: 10.5 G/DL — LOW (ref 11.5–15.5)
IMM GRANULOCYTES NFR BLD AUTO: 0.5 % — SIGNIFICANT CHANGE UP (ref 0–1.5)
LYMPHOCYTES # BLD AUTO: 1.03 K/UL — SIGNIFICANT CHANGE UP (ref 1–3.3)
LYMPHOCYTES # BLD AUTO: 9.9 % — LOW (ref 13–44)
MCHC RBC-ENTMCNC: 33.2 PG — SIGNIFICANT CHANGE UP (ref 27–34)
MCHC RBC-ENTMCNC: 33.8 GM/DL — SIGNIFICANT CHANGE UP (ref 32–36)
MCV RBC AUTO: 98.4 FL — SIGNIFICANT CHANGE UP (ref 80–100)
MONOCYTES # BLD AUTO: 1.24 K/UL — HIGH (ref 0–0.9)
MONOCYTES NFR BLD AUTO: 11.9 % — SIGNIFICANT CHANGE UP (ref 2–14)
NEUTROPHILS # BLD AUTO: 8.03 K/UL — HIGH (ref 1.8–7.4)
NEUTROPHILS NFR BLD AUTO: 77 % — SIGNIFICANT CHANGE UP (ref 43–77)
NRBC # BLD: 0 /100 WBCS — SIGNIFICANT CHANGE UP (ref 0–0)
PLATELET # BLD AUTO: 258 K/UL — SIGNIFICANT CHANGE UP (ref 150–400)
POTASSIUM SERPL-MCNC: 3.5 MMOL/L — SIGNIFICANT CHANGE UP (ref 3.5–5.3)
POTASSIUM SERPL-SCNC: 3.5 MMOL/L — SIGNIFICANT CHANGE UP (ref 3.5–5.3)
PROT SERPL-MCNC: 5.6 G/DL — LOW (ref 6–8.3)
RBC # BLD: 3.16 M/UL — LOW (ref 3.8–5.2)
RBC # FLD: 13.1 % — SIGNIFICANT CHANGE UP (ref 10.3–14.5)
SODIUM SERPL-SCNC: 138 MMOL/L — SIGNIFICANT CHANGE UP (ref 135–145)
WBC # BLD: 10.42 K/UL — SIGNIFICANT CHANGE UP (ref 3.8–10.5)
WBC # FLD AUTO: 10.42 K/UL — SIGNIFICANT CHANGE UP (ref 3.8–10.5)

## 2020-11-21 PROCEDURE — 99233 SBSQ HOSP IP/OBS HIGH 50: CPT

## 2020-11-21 PROCEDURE — 93010 ELECTROCARDIOGRAM REPORT: CPT

## 2020-11-21 PROCEDURE — 74176 CT ABD & PELVIS W/O CONTRAST: CPT | Mod: 26

## 2020-11-21 RX ORDER — ACETAMINOPHEN 500 MG
1000 TABLET ORAL EVERY 6 HOURS
Refills: 0 | Status: DISCONTINUED | OUTPATIENT
Start: 2020-11-21 | End: 2020-11-21

## 2020-11-21 RX ORDER — HYDROMORPHONE HYDROCHLORIDE 2 MG/ML
0.5 INJECTION INTRAMUSCULAR; INTRAVENOUS; SUBCUTANEOUS ONCE
Refills: 0 | Status: DISCONTINUED | OUTPATIENT
Start: 2020-11-21 | End: 2020-11-21

## 2020-11-21 RX ORDER — HYDROMORPHONE HYDROCHLORIDE 2 MG/ML
0.5 INJECTION INTRAMUSCULAR; INTRAVENOUS; SUBCUTANEOUS ONCE
Refills: 0 | Status: DISCONTINUED | OUTPATIENT
Start: 2020-11-21 | End: 2020-11-22

## 2020-11-21 RX ORDER — SENNA PLUS 8.6 MG/1
1 TABLET ORAL EVERY 12 HOURS
Refills: 0 | Status: DISCONTINUED | OUTPATIENT
Start: 2020-11-21 | End: 2020-11-22

## 2020-11-21 RX ORDER — ACETAMINOPHEN 500 MG
975 TABLET ORAL EVERY 6 HOURS
Refills: 0 | Status: DISCONTINUED | OUTPATIENT
Start: 2020-11-21 | End: 2020-11-22

## 2020-11-21 RX ADMIN — Medication 2 TABLET(S): at 08:37

## 2020-11-21 RX ADMIN — Medication 1000 MILLIGRAM(S): at 00:39

## 2020-11-21 RX ADMIN — HYDROMORPHONE HYDROCHLORIDE 2 MILLIGRAM(S): 2 INJECTION INTRAMUSCULAR; INTRAVENOUS; SUBCUTANEOUS at 12:30

## 2020-11-21 RX ADMIN — HYDROMORPHONE HYDROCHLORIDE 2 MILLIGRAM(S): 2 INJECTION INTRAMUSCULAR; INTRAVENOUS; SUBCUTANEOUS at 11:38

## 2020-11-21 RX ADMIN — FAMOTIDINE 20 MILLIGRAM(S): 10 INJECTION INTRAVENOUS at 11:38

## 2020-11-21 RX ADMIN — HYDROMORPHONE HYDROCHLORIDE 2 MILLIGRAM(S): 2 INJECTION INTRAMUSCULAR; INTRAVENOUS; SUBCUTANEOUS at 23:45

## 2020-11-21 RX ADMIN — SENNA PLUS 1 TABLET(S): 8.6 TABLET ORAL at 15:24

## 2020-11-21 RX ADMIN — Medication 975 MILLIGRAM(S): at 17:30

## 2020-11-21 RX ADMIN — HYDROMORPHONE HYDROCHLORIDE 0.5 MILLIGRAM(S): 2 INJECTION INTRAMUSCULAR; INTRAVENOUS; SUBCUTANEOUS at 03:16

## 2020-11-21 RX ADMIN — Medication 400 MILLIGRAM(S): at 08:30

## 2020-11-21 RX ADMIN — TAMSULOSIN HYDROCHLORIDE 0.4 MILLIGRAM(S): 0.4 CAPSULE ORAL at 21:35

## 2020-11-21 RX ADMIN — Medication 2 TABLET(S): at 03:23

## 2020-11-21 RX ADMIN — Medication 1 TABLET(S): at 11:38

## 2020-11-21 RX ADMIN — HYDROMORPHONE HYDROCHLORIDE 2 MILLIGRAM(S): 2 INJECTION INTRAMUSCULAR; INTRAVENOUS; SUBCUTANEOUS at 23:11

## 2020-11-21 RX ADMIN — Medication 1000 MILLIGRAM(S): at 09:00

## 2020-11-21 RX ADMIN — Medication 975 MILLIGRAM(S): at 18:30

## 2020-11-21 NOTE — PROVIDER CONTACT NOTE (OTHER) - ACTION/TREATMENT ORDERED:
MD aware. No orders at this time.
Dr. Cheung made aware, NST completed. Pt to be evaluated by Dr. Cheung.
Dr. Cheung made aware, Pt placed on monitor for NST will continue with plan of care.
Dr. Shetty made aware, Pt for CT this morning.

## 2020-11-21 NOTE — PROGRESS NOTE ADULT - SUBJECTIVE AND OBJECTIVE BOX
PARASKEVI GAVALAS    R3 Antepartum Note, HD#3    Interval events: Patient seen and evaluated for perceived contractions, detected on toco. Not painful, but exacerbating flank pain    Patient seen and examined at bedside, no acute overnight events. No acute complaints. Pt reports +FM, denies LOF, VB, ctx, HA, epigastric pain, blurred vision, CP, SOB, N/V, fevers, and chills.    Vital Signs Last 24 Hours  T(C): 36.8 (11-20-20 @ 20:32), Max: 37 (11-20-20 @ 05:23)  HR: 115 (11-20-20 @ 20:32) (102 - 115)  BP: 97/67 (11-20-20 @ 20:32) (94/59 - 108/69)  RR: 18 (11-20-20 @ 20:32) (17 - 20)  SpO2: 97% (11-20-20 @ 20:32) (96% - 99%)    CAPILLARY BLOOD GLUCOSE          Physical Exam:  General: NAD  Abdomen: Soft, non-tender, gravid  SVE: CLH  Ext: No pain or swelling  TVUS: CL 3cm    NST reactive overnight    Labs:             11.5   10.31 )-----------( 250      ( 11-19 @ 04:21 )             34.1     11-19 @ 04:21    135  |  102  |  12  ----------------------------<  102  3.9   |  21  |  0.55    Ca    9.1      11-19 @ 04:21              MEDICATIONS  (STANDING):  famotidine    Tablet 20 milliGRAM(s) Oral daily  influenza   Vaccine 0.5 milliLiter(s) IntraMuscular once  lactated ringers. 1000 milliLiter(s) (250 mL/Hr) IV Continuous <Continuous>  prenatal multivitamin 1 Tablet(s) Oral daily  tamsulosin 0.4 milliGRAM(s) Oral at bedtime    MEDICATIONS  (PRN):  acetaminophen   Tablet .. 1000 milliGRAM(s) Oral every 6 hours PRN Moderate Pain (4 - 6)  calcium carbonate    500 mG (Tums) Chewable 2 Tablet(s) Chew every 6 hours PRN Heartburn  HYDROmorphone   Tablet 2 milliGRAM(s) Oral every 3 hours PRN Severe Pain (7 - 10)  ondansetron Injectable 4 milliGRAM(s) IV Push every 6 hours PRN Nausea and/or Vomiting               PARASKEVI GAVALAS    R3 Antepartum Note, HD#3    Interval events: Patient seen and evaluated for perceived contractions, detected on toco. Patient reports that flank pain has resolved; pain has now migrated to RLQ, constant, 8/10, and feels worse with contractions. Patient "feels bloated." Nausea has resolved. No vomiting. Patient has an appetite.    Patient seen and examined at bedside, no acute overnight events. No acute complaints. Pt reports +FM, denies LOF, VB, ctx, HA, epigastric pain, blurred vision, CP, SOB, N/V, fevers, and chills.    Vital Signs Last 24 Hours  T(C): 36.8 (11-20-20 @ 20:32), Max: 37 (11-20-20 @ 05:23)  HR: 115 (11-20-20 @ 20:32) (102 - 115)  BP: 97/67 (11-20-20 @ 20:32) (94/59 - 108/69)  RR: 18 (11-20-20 @ 20:32) (17 - 20)  SpO2: 97% (11-20-20 @ 20:32) (96% - 99%)    CAPILLARY BLOOD GLUCOSE          Physical Exam:  General: NAD  Abdomen: Soft, gravid. TTP RLQ and with R deviation of uterus. +guarding, no rebound. No CVAT  SVE: CLH  Ext: No pain or swelling  TVUS: CL 3.2-3.4cm. Simple-appearing fluid in posterior cul de sac  TAUS: simple-appearing intraabdominal fluid noted, 3-3.7cm    NST reactive overnight    Labs:             11.5   10.31 )-----------( 250      ( 11-19 @ 04:21 )             34.1     11-19 @ 04:21    135  |  102  |  12  ----------------------------<  102  3.9   |  21  |  0.55    Ca    9.1      11-19 @ 04:21              MEDICATIONS  (STANDING):  famotidine    Tablet 20 milliGRAM(s) Oral daily  influenza   Vaccine 0.5 milliLiter(s) IntraMuscular once  lactated ringers. 1000 milliLiter(s) (250 mL/Hr) IV Continuous <Continuous>  prenatal multivitamin 1 Tablet(s) Oral daily  tamsulosin 0.4 milliGRAM(s) Oral at bedtime    MEDICATIONS  (PRN):  acetaminophen   Tablet .. 1000 milliGRAM(s) Oral every 6 hours PRN Moderate Pain (4 - 6)  calcium carbonate    500 mG (Tums) Chewable 2 Tablet(s) Chew every 6 hours PRN Heartburn  HYDROmorphone   Tablet 2 milliGRAM(s) Oral every 3 hours PRN Severe Pain (7 - 10)  ondansetron Injectable 4 milliGRAM(s) IV Push every 6 hours PRN Nausea and/or Vomiting

## 2020-11-21 NOTE — PROGRESS NOTE ADULT - ASSESSMENT
30y  at 25w2d with R flank pain with radiation to RLQ, clinically consistent with nephrolithiasis. UA with trace blood and moderate calcium oxalate crystals. Renal sono with mild to moderate R hydronephrosis, right ureteral jet not visualized. Low concern for infected stone given pt is afebrile with WBC 10. Patient's pain well managed with oral medications overnight. Patient radhika on tocometer, no signs of  labor    #Nephrolithiasis   -s/p 2L LR, mIVF@250  -Strain all voids  -Continue Dilaudid 2mg q3h PRN, Tylenol 975 q6h PRN  -Appreciate Uro recs    #Fetal Well Being  -Cat 1 tracing, fetal status reassuring  -NST BID    #Maternal well-being  -Diet as tolerated  -Continue to monitor VS  -Continue PNV  -SCDs for DVT ppx     Kayleigh Cheung PGY3           30y  at 25w2d with R flank pain with radiation to RLQ, clinically consistent with nephrolithiasis. UA with trace blood and moderate calcium oxalate crystals. Renal sono with mild to moderate R hydronephrosis, right ureteral jet not visualized. Low concern for infected stone given pt is afebrile with WBC 10. Patient now with intractable RLQ pain and fluid noted on transabdominal sono. Possible ureteral stone with intraabdominal inflammation. Will get CT to better visualize.    #Nephrolithiasis   -s/p 2L LR, mIVF@250  -Strain all voids  -Continue Dilaudid 2mg q3h PRN, Tylenol 975 q6h PRN  -Appreciate Uro recs  -CT-AP  -CBC/CMP    #Fetal Well Being  -Cat 1 tracing, fetal status reassuring  -NST BID  -No PTL    #Maternal well-being  -Diet as tolerated  -Continue to monitor VS  -Continue PNV  -SCDs for DVT ppx     d/w Dr. Bereket Cheung PGY3

## 2020-11-21 NOTE — CHART NOTE - NSCHARTNOTEFT_GEN_A_CORE
The patient was evaluated at bedside. Patient said her pain had improved from earlier. CT scan not concerning at this time per urology. Urology evaluated patient and said nothing to do at this time. Denies HA, dizziness, lightheadedness, CP, SOB, palpitations. +FM. Denies LOF/VB/Ctx    Vital Signs Last 24 Hrs  T(C): 36.9 (21 Nov 2020 15:50), Max: 37 (20 Nov 2020 17:06)  T(F): 98.4 (21 Nov 2020 15:50), Max: 98.6 (20 Nov 2020 17:06)  HR: 122 (21 Nov 2020 15:50) (105 - 122)  BP: 99/62 (21 Nov 2020 15:50) (84/52 - 106/71)  BP(mean): --  RR: 18 (21 Nov 2020 15:50) (17 - 18)  SpO2: 97% (21 Nov 2020 15:50) (95% - 100%)    Physical exam  Gen: NAD, A&Ox3  Abd: Soft, gravid uterus, +RLQ/flank/CVA tenderness, no guarding/rebound      A/P  -f/u final Urology recs  -Continue NST BID  -Continue to monitor VS  -PRN PO pain control     Msantandreu pgy3 The patient was evaluated at bedside. Patient said her pain had improved from earlier. CT scan not concerning at this time per urology. Urology evaluated patient and said nothing to do at this time. Denies HA, dizziness, lightheadedness, CP, SOB, palpitations. +FM. Denies LOF/VB/Ctx    Vital Signs Last 24 Hrs  T(C): 36.9 (21 Nov 2020 15:50), Max: 37 (20 Nov 2020 17:06)  T(F): 98.4 (21 Nov 2020 15:50), Max: 98.6 (20 Nov 2020 17:06)  HR: 122 (21 Nov 2020 15:50) (105 - 122)  BP: 99/62 (21 Nov 2020 15:50) (84/52 - 106/71)  BP(mean): --  RR: 18 (21 Nov 2020 15:50) (17 - 18)  SpO2: 97% (21 Nov 2020 15:50) (95% - 100%)    Physical exam  Gen: NAD, A&Ox3  Abd: Soft, gravid uterus, +RLQ/flank/CVA tenderness, no guarding/rebound      A/P  -f/u final Urology recs  -Continue NST BID  -Continue to monitor VS  -PRN PO pain control   -f/u GBS swab performed today    Msantandreu pgy3

## 2020-11-21 NOTE — PROGRESS NOTE ADULT - ASSESSMENT
31y/o female 25 weeks pregnant with right renal colic, CT shows no stone but perirenal fluid, possible calyceal rupture  Afebrile.  Urine culture normal  alaina.    Recommendation:  -no surgical intervention required, fluid will reabsorb over time and pain should improve,   -c/w analgesic prn  -no need to strain urine  -

## 2020-11-21 NOTE — PROGRESS NOTE ADULT - SUBJECTIVE AND OBJECTIVE BOX
Subjective  Pt seen and examined, still requiring pain medications. feels better this moment.  CT scan performed showing fluid extrav around kidney, no evidence of stone  Objective    Vital signs  T(F): , Max: 98.6 (11-20-20 @ 17:06)  HR: 122 (11-21-20 @ 15:50)  BP: 99/62 (11-21-20 @ 15:50)  SpO2: 97% (11-21-20 @ 15:50)  Wt(kg): --    Output     OUT:    Voided (mL): 3525 mL  Total OUT: 3525 mL    Total NET: -3525 mL          Gen: NAD  Abd: S/mild tender/ gravd      Labs      11-21 @ 04:07    WBC 10.42 / Hct 31.1  / SCr 0.58       Urine Cx: ?  Blood Cx: ?    Imaging  < from: CT Abdomen and Pelvis No Cont (11.21.20 @ 10:34) >    EXAM:  CT ABDOMEN AND PELVIS                            PROCEDURE DATE:  11/21/2020            INTERPRETATION:  CLINICAL INFORMATION: Right flank pain, hematuria    COMPARISON: Renal ultrasound from 11/19/2020    PROCEDURE:  CT of the Abdomen and Pelvis was performed without intravenous contrast.  Intravenous contrast: None.  Oral contrast: None.  Sagittal and coronal reformats were performed.    FINDINGS:  LOWER CHEST: Small right and trace left pleural effusions with right lower lobe compressive and linear atelectasis.    LIVER: Within normal limits.  BILE DUCTS: Normal caliber.  GALLBLADDER: Within normal limits.  SPLEEN: Within normal limits.  PANCREAS: Within normal limits.  ADRENALS: Within normal limits.  KIDNEYS/URETERS: Mild right hydroureteronephrosis. No obstructing calculus. Asymmetric right perinephric stranding. Tiny calculus in the right hemipelvis (series 3, image 129), possibly related to the right ureter.    BLADDER: Within normal limits.  REPRODUCTIVE ORGANS: A singleintrauterine gestation is present. Anterior placenta. Breech positioning.    BOWEL: No bowel obstruction. Appendix is not visualized. No evidence of inflammation in the pericecal region.  PERITONEUM: Mild volume intraperitoneal free fluid, greatest in the right hemiabdomen.  VESSELS: Within normal limits.  RETROPERITONEUM/LYMPH NODES: No lymphadenopathy.  ABDOMINAL WALL: Within normal limits.  BONES: Within normal limits.    IMPRESSION:  1.  Mild right hydronephrosis and asymmetric perinephric stranding. No obstructing calculus is identified. Differential considerations include passed stone and/or right renal caliceal rupture.  2.  Nonspecific mild volume intraperitoneal free fluid, greater on the right. Please correlate for other causes of ascites.  3.  Small right and trace left pleural effusions with right lower lobe compressive and linear atelectasis.  4.  Single intrauterine gestation in breech position with anterior placenta.              BISHOP REYES MD; Resident Interventional Radiology  This document has been electronically signed.  FUNMI REDDY M.D., ATTENDING RADIOLOGIST    < end of copied text >   Subjective  Pt seen and examined, still requiring pain medications. feels better this moment.  CT scan performed showing fluid extrav around kidney, no evidence of stone  Objective    Vital signs  T(F): , Max: 98.6 (11-20-20 @ 17:06)  HR: 122 (11-21-20 @ 15:50)  BP: 99/62 (11-21-20 @ 15:50)  SpO2: 97% (11-21-20 @ 15:50)  Wt(kg): --    Output     OUT:    Voided (mL): 3525 mL  Total OUT: 3525 mL    Total NET: -3525 mL          Gen: NAD  Abd: S/mild tender/ gravd  Resp. wnl    Labs      11-21 @ 04:07    WBC 10.42 / Hct 31.1  / SCr 0.58       Urine Cx: ?  Blood Cx: ?    Imaging  < from: CT Abdomen and Pelvis No Cont (11.21.20 @ 10:34) >    EXAM:  CT ABDOMEN AND PELVIS                            PROCEDURE DATE:  11/21/2020            INTERPRETATION:  CLINICAL INFORMATION: Right flank pain, hematuria    COMPARISON: Renal ultrasound from 11/19/2020    PROCEDURE:  CT of the Abdomen and Pelvis was performed without intravenous contrast.  Intravenous contrast: None.  Oral contrast: None.  Sagittal and coronal reformats were performed.    FINDINGS:  LOWER CHEST: Small right and trace left pleural effusions with right lower lobe compressive and linear atelectasis.    LIVER: Within normal limits.  BILE DUCTS: Normal caliber.  GALLBLADDER: Within normal limits.  SPLEEN: Within normal limits.  PANCREAS: Within normal limits.  ADRENALS: Within normal limits.  KIDNEYS/URETERS: Mild right hydroureteronephrosis. No obstructing calculus. Asymmetric right perinephric stranding. Tiny calculus in the right hemipelvis (series 3, image 129), possibly related to the right ureter.    BLADDER: Within normal limits.  REPRODUCTIVE ORGANS: A singleintrauterine gestation is present. Anterior placenta. Breech positioning.    BOWEL: No bowel obstruction. Appendix is not visualized. No evidence of inflammation in the pericecal region.  PERITONEUM: Mild volume intraperitoneal free fluid, greatest in the right hemiabdomen.  VESSELS: Within normal limits.  RETROPERITONEUM/LYMPH NODES: No lymphadenopathy.  ABDOMINAL WALL: Within normal limits.  BONES: Within normal limits.    IMPRESSION:  1.  Mild right hydronephrosis and asymmetric perinephric stranding. No obstructing calculus is identified. Differential considerations include passed stone and/or right renal caliceal rupture.  2.  Nonspecific mild volume intraperitoneal free fluid, greater on the right. Please correlate for other causes of ascites.  3.  Small right and trace left pleural effusions with right lower lobe compressive and linear atelectasis.  4.  Single intrauterine gestation in breech position with anterior placenta.              BISHOP REYES MD; Resident Interventional Radiology  This document has been electronically signed.  FUNMI REDDY M.D., ATTENDING RADIOLOGIST    < end of copied text >

## 2020-11-22 VITALS
TEMPERATURE: 98 F | RESPIRATION RATE: 18 BRPM | DIASTOLIC BLOOD PRESSURE: 65 MMHG | SYSTOLIC BLOOD PRESSURE: 97 MMHG | HEART RATE: 102 BPM | OXYGEN SATURATION: 98 %

## 2020-11-22 DIAGNOSIS — Z34.90 ENCOUNTER FOR SUPERVISION OF NORMAL PREGNANCY, UNSPECIFIED, UNSPECIFIED TRIMESTER: ICD-10-CM

## 2020-11-22 DIAGNOSIS — R00.0 TACHYCARDIA, UNSPECIFIED: ICD-10-CM

## 2020-11-22 LAB
ANION GAP SERPL CALC-SCNC: 10 MMOL/L — SIGNIFICANT CHANGE UP (ref 5–17)
BUN SERPL-MCNC: 4 MG/DL — LOW (ref 7–23)
CALCIUM SERPL-MCNC: 8.6 MG/DL — SIGNIFICANT CHANGE UP (ref 8.4–10.5)
CHLORIDE SERPL-SCNC: 106 MMOL/L — SIGNIFICANT CHANGE UP (ref 96–108)
CO2 SERPL-SCNC: 25 MMOL/L — SIGNIFICANT CHANGE UP (ref 22–31)
CREAT SERPL-MCNC: 0.38 MG/DL — LOW (ref 0.5–1.3)
GLUCOSE SERPL-MCNC: 80 MG/DL — SIGNIFICANT CHANGE UP (ref 70–99)
GROUP B BETA STREP DNA (PCR): SIGNIFICANT CHANGE UP
GROUP B BETA STREP INTERPRETATION: SIGNIFICANT CHANGE UP
HCT VFR BLD CALC: 31.1 % — LOW (ref 34.5–45)
HGB BLD-MCNC: 10 G/DL — LOW (ref 11.5–15.5)
MCHC RBC-ENTMCNC: 32.2 GM/DL — SIGNIFICANT CHANGE UP (ref 32–36)
MCHC RBC-ENTMCNC: 32.5 PG — SIGNIFICANT CHANGE UP (ref 27–34)
MCV RBC AUTO: 101 FL — HIGH (ref 80–100)
NRBC # BLD: 0 /100 WBCS — SIGNIFICANT CHANGE UP (ref 0–0)
PLATELET # BLD AUTO: 256 K/UL — SIGNIFICANT CHANGE UP (ref 150–400)
POTASSIUM SERPL-MCNC: 3.6 MMOL/L — SIGNIFICANT CHANGE UP (ref 3.5–5.3)
POTASSIUM SERPL-SCNC: 3.6 MMOL/L — SIGNIFICANT CHANGE UP (ref 3.5–5.3)
RBC # BLD: 3.08 M/UL — LOW (ref 3.8–5.2)
RBC # FLD: 13.2 % — SIGNIFICANT CHANGE UP (ref 10.3–14.5)
SODIUM SERPL-SCNC: 141 MMOL/L — SIGNIFICANT CHANGE UP (ref 135–145)
SOURCE GROUP B STREP: SIGNIFICANT CHANGE UP
WBC # BLD: 9.02 K/UL — SIGNIFICANT CHANGE UP (ref 3.8–10.5)
WBC # FLD AUTO: 9.02 K/UL — SIGNIFICANT CHANGE UP (ref 3.8–10.5)

## 2020-11-22 PROCEDURE — 74176 CT ABD & PELVIS W/O CONTRAST: CPT

## 2020-11-22 PROCEDURE — 81001 URINALYSIS AUTO W/SCOPE: CPT

## 2020-11-22 PROCEDURE — 85027 COMPLETE CBC AUTOMATED: CPT

## 2020-11-22 PROCEDURE — 59025 FETAL NON-STRESS TEST: CPT

## 2020-11-22 PROCEDURE — 76770 US EXAM ABDO BACK WALL COMP: CPT

## 2020-11-22 PROCEDURE — 86769 SARS-COV-2 COVID-19 ANTIBODY: CPT

## 2020-11-22 PROCEDURE — G0463: CPT

## 2020-11-22 PROCEDURE — 86850 RBC ANTIBODY SCREEN: CPT

## 2020-11-22 PROCEDURE — 86900 BLOOD TYPING SEROLOGIC ABO: CPT

## 2020-11-22 PROCEDURE — 99222 1ST HOSP IP/OBS MODERATE 55: CPT

## 2020-11-22 PROCEDURE — 85025 COMPLETE CBC W/AUTO DIFF WBC: CPT

## 2020-11-22 PROCEDURE — 86901 BLOOD TYPING SEROLOGIC RH(D): CPT

## 2020-11-22 PROCEDURE — 80053 COMPREHEN METABOLIC PANEL: CPT

## 2020-11-22 PROCEDURE — 99238 HOSP IP/OBS DSCHRG MGMT 30/<: CPT

## 2020-11-22 PROCEDURE — 87635 SARS-COV-2 COVID-19 AMP PRB: CPT

## 2020-11-22 PROCEDURE — 80048 BASIC METABOLIC PNL TOTAL CA: CPT

## 2020-11-22 PROCEDURE — 87653 STREP B DNA AMP PROBE: CPT

## 2020-11-22 PROCEDURE — 93005 ELECTROCARDIOGRAM TRACING: CPT

## 2020-11-22 PROCEDURE — 87086 URINE CULTURE/COLONY COUNT: CPT

## 2020-11-22 RX ORDER — SENNA PLUS 8.6 MG/1
1 TABLET ORAL
Qty: 20 | Refills: 0
Start: 2020-11-22 | End: 2020-12-01

## 2020-11-22 RX ORDER — OXYCODONE HYDROCHLORIDE 5 MG/1
1 TABLET ORAL
Qty: 5 | Refills: 0
Start: 2020-11-22

## 2020-11-22 RX ORDER — ACETAMINOPHEN 500 MG
2 TABLET ORAL
Qty: 40 | Refills: 0
Start: 2020-11-22 | End: 2020-11-26

## 2020-11-22 RX ORDER — SODIUM CHLORIDE 0.65 %
1 AEROSOL, SPRAY (ML) NASAL EVERY 4 HOURS
Refills: 0 | Status: DISCONTINUED | OUTPATIENT
Start: 2020-11-22 | End: 2020-11-22

## 2020-11-22 RX ADMIN — Medication 975 MILLIGRAM(S): at 08:53

## 2020-11-22 RX ADMIN — Medication 2 TABLET(S): at 07:57

## 2020-11-22 RX ADMIN — HYDROMORPHONE HYDROCHLORIDE 2 MILLIGRAM(S): 2 INJECTION INTRAMUSCULAR; INTRAVENOUS; SUBCUTANEOUS at 07:57

## 2020-11-22 RX ADMIN — SENNA PLUS 1 TABLET(S): 8.6 TABLET ORAL at 06:21

## 2020-11-22 RX ADMIN — FAMOTIDINE 20 MILLIGRAM(S): 10 INJECTION INTRAVENOUS at 13:22

## 2020-11-22 RX ADMIN — Medication 975 MILLIGRAM(S): at 03:00

## 2020-11-22 RX ADMIN — Medication 975 MILLIGRAM(S): at 09:23

## 2020-11-22 RX ADMIN — Medication 1 TABLET(S): at 13:22

## 2020-11-22 RX ADMIN — Medication 975 MILLIGRAM(S): at 03:30

## 2020-11-22 NOTE — CHART NOTE - NSCHARTNOTEFT_GEN_A_CORE
The patient was evaluated at bedside. She said that her pain has significantly improved. Denies significant abdominal pain/flank/back pain. She is tolerating regular diet. She was able to walk around with minimal pain. Denies HA, dizziness, lightheadedness, CP, SOB, palpitations, abd pain, vaginal bleeding. +FM. Denies LOF/Ctx.     Vital Signs Last 24 Hrs  T(C): 36.8 (22 Nov 2020 08:53), Max: 37.1 (22 Nov 2020 02:00)  T(F): 98.3 (22 Nov 2020 08:53), Max: 98.8 (22 Nov 2020 02:00)  HR: 106 (22 Nov 2020 08:53) (102 - 122)  BP: 93/60 (22 Nov 2020 08:53) (84/52 - 108/72)  BP(mean): --  RR: 18 (22 Nov 2020 08:53) (18 - 18)  SpO2: 99% (22 Nov 2020 08:53) (95% - 99%)    Physical  Gen: NAD, A&Ox3  Abd: Soft, Gravid uterus, minimal LLQ tenderness, no flank/back tenderness    A/P: Patient is feeling well without complaints today.   -Urology has cleared the patient and she has no apparent kidney stones  -Obstetrically no issues  -Tolerating PO/ambulating  -PO pain medications  -Can continue PO tylenol at home PRN for pain  -She is cleared for discharge at this time    Josse pgy3

## 2020-11-22 NOTE — PROGRESS NOTE ADULT - PROBLEM SELECTOR PLAN 2
-EKG on HD2 sinus tachycardia to 120s  -HR 100s overnight  -consider cardiology consult if tachycardia worsens/persists

## 2020-11-22 NOTE — PROGRESS NOTE ADULT - ASSESSMENT
30y  at 25w3d admitted with R flank pain, labs and imaging consistent with nephrolithiasis. On HD#2 pt had tachycardia and severe pain; CTAP was concerning for R renal calyceal rupture.  Urology recommended conservative management, flomax.

## 2020-11-22 NOTE — PROGRESS NOTE ADULT - ATTENDING COMMENTS
pt seen, examined  films reviewed  appears, given sx and ct findings, that patient likely had small stone cause colic, forniceal extrav at kidney, and passed stone  d/w pt findings, and plan to f/u given risks of diet and metabolic factors for stone disease  following pregnancy, and can reimage with renal us  diet modification reviewed at length
OB ATTENDING NOTE  Patient seen and examined  29yo  at 25w2d admitted with suspected nephrolithiasis in the setting of R flank pain now radiating to the RLQ. Reports intermittent return of her appetite. Sitting up in bed eating mac and cheese. Denies nausea, vomiting, SOB, CP. No LOF, VB, contractions and appreciates fetal movement. Reports intermittent RLQ pain alleviated by lying still. Pain is occasionally crampy sometimes sharp.  Pain well controlled on current regimen. Ruled outfor PTL. +Flatus, no BM for the last 3 days which is abnormal for her. AF, VS wnl, occasional mild tachycardia noted however within the normal range for physiologic changes related to pregnancy. NST reactive today baseline 145, moderate variability, + acels, no decels. Labs overall reassuring without leukocytosis. CT A/P obtained per Urology recs - without e/o nephrolithiasis, free fluid in RLQ physiologic vs passed stone vs calyceal rupture. Will continue to optimize pain regimen, continue tamsulosin.  - Awaiting urology recs  - Will continue monitoring    CWChan
PT seen and examined. Agree with above. Urology consult today.
OB ATTENDING NOTE  Pt reporting interval resolution in her RLQ pain, and R sided back pain much improved. Appreciates FM. No CTX, LOF, VB. No CP, SOB, fevers or chills. No dysuria. Has been taking less dilaudid. No BM, +more flatus per pt. She remains HD stable, no longer hypotensive and tachycardia in the physiologic range for pregnancy. Exam notable for soft, NT, mildly distended abdomen without TTP. Mild R CVAT much improved from yesterday. AM labs overall reassuring and stable.   In brief, Ms Green is a 29yo  at 25w3d EGA admitted with suspected nephrolithiasis. Workup notable for bilateral mild L and mild-moderate R hydronephrosis cw physiologic changes in pregnancy. CT A/P notable for R calyceal rupture, appreciate Urology recs and likely related to recent stone passage. Patient continues to improve with primary goal now - pain management. Pt hypotensive and tachycardic yesterday likely 2/2 narcotic use, now resolved. EKG with sinus tachycardia. No longer having palpitations. With regards to her clinical course, patient has been r/o for pyelonephritis, ovarian torsion, and appendicitis. She continues to improve. Will monitor today and if pain management adequate, likely discharge home for close outpatient followup    SAJI Yañez Fellow  PGY5  Dw Dr. [place]

## 2020-11-22 NOTE — PROGRESS NOTE ADULT - SUBJECTIVE AND OBJECTIVE BOX
R4 ANTEPARTUM PROGRESS NOTE    HD#4    SUBJECTIVE:  Patient seen and examined at bedside, no acute overnight events.  Pt currently reports minimal pain but says that she has right flank/low back/RLQ pain that is 6/10 with movement.    Patient reports ambulation has been limited 2/2 pain.  She is passing flatus, voiding spontaneously, and tolerating regular diet. Denies CP, SOB, N/V, fevers, and chills.  She denies vb/lof/ctx.  She has good FM.    Vital Signs Last 24 Hours  T(C): 37.1 (11-22-20 @ 02:00), Max: 37.1 (11-22-20 @ 02:00)  HR: 105 (11-22-20 @ 02:00) (105 - 122)  BP: 92/56 (11-22-20 @ 02:00) (84/52 - 105/69)  RR: 18 (11-22-20 @ 02:00) (18 - 18)  SpO2: 97% (11-22-20 @ 02:00) (95% - 98%)    I&O's Summary    20 Nov 2020 07:01  -  21 Nov 2020 07:00  --------------------------------------------------------  IN: 1000 mL / OUT: 4025 mL / NET: -3025 mL    21 Nov 2020 07:01  -  22 Nov 2020 04:34  --------------------------------------------------------  IN: 0 mL / OUT: 1400 mL / NET: -1400 mL    Physical Exam:  General: NAD  CV: RRR  Lungs: CTA-B  Abdomen: Soft, mild ttp in RLQ, nondistended  Ext: No pain or swelling    Labs:                        10.5   10.42 )-----------( 258      ( 21 Nov 2020 04:07 )             31.1   baso 0.2    eos 0.5    imm gran 0.5    lymph 9.9    mono 11.9   poly 77.0     11-21    138  |  104  |  4<L>  ----------------------------<  98  3.5   |  22  |  0.58    Ca    8.8      21 Nov 2020 04:07    TPro  5.6<L>  /  Alb  3.0<L>  /  TBili  0.4  /  DBili  x   /  AST  15  /  ALT  16  /  AlkPhos  63  11-21          Blood Type: A Positive      MEDICATIONS  (STANDING):  acetaminophen   Tablet .. 975 milliGRAM(s) Oral every 6 hours  bisacodyl Suppository 10 milliGRAM(s) Rectal once  famotidine    Tablet 20 milliGRAM(s) Oral daily  HYDROmorphone  Injectable 0.5 milliGRAM(s) IV Push once  influenza   Vaccine 0.5 milliLiter(s) IntraMuscular once  lactated ringers. 1000 milliLiter(s) (125 mL/Hr) IV Continuous <Continuous>  prenatal multivitamin 1 Tablet(s) Oral daily  senna 1 Tablet(s) Oral every 12 hours  tamsulosin 0.4 milliGRAM(s) Oral at bedtime    MEDICATIONS  (PRN):  calcium carbonate    500 mG (Tums) Chewable 2 Tablet(s) Chew every 6 hours PRN Heartburn  HYDROmorphone   Tablet 2 milliGRAM(s) Oral every 3 hours PRN Severe Pain (7 - 10)  ondansetron Injectable 4 milliGRAM(s) IV Push every 6 hours PRN Nausea and/or Vomiting

## 2020-11-22 NOTE — PROGRESS NOTE ADULT - PROBLEM SELECTOR PLAN 3
-NST BID  -BPP q1wk  -continue pnv  -continue reg diet  -f/u GBS (11/21)  -continue routine inpt care    LIBIA Domínguez MD PGY4

## 2020-11-22 NOTE — PROGRESS NOTE ADULT - PROBLEM SELECTOR PLAN 1
-appreicate Urology recs  -continue flomax, IVF  -pain control prn w/ dilaudid, tylenol  -f/u AM CBC/CMP

## 2020-11-29 ENCOUNTER — FORM ENCOUNTER (OUTPATIENT)
Age: 30
End: 2020-11-29

## 2020-11-30 ENCOUNTER — APPOINTMENT (OUTPATIENT)
Dept: OBGYN | Facility: CLINIC | Age: 30
End: 2020-11-30
Payer: COMMERCIAL

## 2020-11-30 PROCEDURE — 36415 COLL VENOUS BLD VENIPUNCTURE: CPT

## 2020-11-30 PROCEDURE — 0502F SUBSEQUENT PRENATAL CARE: CPT

## 2020-12-09 ENCOUNTER — FORM ENCOUNTER (OUTPATIENT)
Age: 30
End: 2020-12-09

## 2020-12-15 ENCOUNTER — FORM ENCOUNTER (OUTPATIENT)
Age: 30
End: 2020-12-15

## 2020-12-16 ENCOUNTER — APPOINTMENT (OUTPATIENT)
Dept: OBGYN | Facility: CLINIC | Age: 30
End: 2020-12-16
Payer: COMMERCIAL

## 2020-12-16 PROBLEM — E28.2 POLYCYSTIC OVARIAN SYNDROME: Chronic | Status: ACTIVE | Noted: 2020-11-19

## 2020-12-16 PROBLEM — O03.9 COMPLETE OR UNSPECIFIED SPONTANEOUS ABORTION WITHOUT COMPLICATION: Chronic | Status: ACTIVE | Noted: 2020-11-19

## 2020-12-16 PROCEDURE — 90715 TDAP VACCINE 7 YRS/> IM: CPT

## 2020-12-16 PROCEDURE — 0502F SUBSEQUENT PRENATAL CARE: CPT

## 2020-12-16 PROCEDURE — 90471 IMMUNIZATION ADMIN: CPT

## 2020-12-29 ENCOUNTER — APPOINTMENT (OUTPATIENT)
Dept: OBGYN | Facility: CLINIC | Age: 30
End: 2020-12-29
Payer: COMMERCIAL

## 2020-12-29 PROCEDURE — 76816 OB US FOLLOW-UP PER FETUS: CPT

## 2020-12-29 PROCEDURE — 99072 ADDL SUPL MATRL&STAF TM PHE: CPT

## 2021-01-11 ENCOUNTER — APPOINTMENT (OUTPATIENT)
Dept: OBGYN | Facility: CLINIC | Age: 31
End: 2021-01-11
Payer: COMMERCIAL

## 2021-01-11 PROCEDURE — 0502F SUBSEQUENT PRENATAL CARE: CPT

## 2021-01-26 ENCOUNTER — APPOINTMENT (OUTPATIENT)
Dept: OBGYN | Facility: CLINIC | Age: 31
End: 2021-01-26
Payer: COMMERCIAL

## 2021-01-26 PROCEDURE — 0502F SUBSEQUENT PRENATAL CARE: CPT

## 2021-02-02 ENCOUNTER — APPOINTMENT (OUTPATIENT)
Dept: OBGYN | Facility: CLINIC | Age: 31
End: 2021-02-02

## 2021-02-03 ENCOUNTER — FORM ENCOUNTER (OUTPATIENT)
Age: 31
End: 2021-02-03

## 2021-02-04 ENCOUNTER — APPOINTMENT (OUTPATIENT)
Dept: OBGYN | Facility: CLINIC | Age: 31
End: 2021-02-04
Payer: COMMERCIAL

## 2021-02-04 PROCEDURE — 99072 ADDL SUPL MATRL&STAF TM PHE: CPT

## 2021-02-04 PROCEDURE — 76816 OB US FOLLOW-UP PER FETUS: CPT

## 2021-02-04 PROCEDURE — 0502F SUBSEQUENT PRENATAL CARE: CPT

## 2021-02-07 ENCOUNTER — FORM ENCOUNTER (OUTPATIENT)
Age: 31
End: 2021-02-07

## 2021-02-10 ENCOUNTER — APPOINTMENT (OUTPATIENT)
Dept: OBGYN | Facility: CLINIC | Age: 31
End: 2021-02-10

## 2021-02-16 ENCOUNTER — FORM ENCOUNTER (OUTPATIENT)
Age: 31
End: 2021-02-16

## 2021-02-17 ENCOUNTER — APPOINTMENT (OUTPATIENT)
Dept: OBGYN | Facility: CLINIC | Age: 31
End: 2021-02-17

## 2021-02-20 ENCOUNTER — TRANSCRIPTION ENCOUNTER (OUTPATIENT)
Age: 31
End: 2021-02-20

## 2021-02-21 ENCOUNTER — INPATIENT (INPATIENT)
Facility: HOSPITAL | Age: 31
LOS: 2 days | Discharge: HOME | End: 2021-02-24
Attending: OBSTETRICS & GYNECOLOGY | Admitting: OBSTETRICS & GYNECOLOGY
Payer: COMMERCIAL

## 2021-02-21 VITALS
HEART RATE: 98 BPM | DIASTOLIC BLOOD PRESSURE: 84 MMHG | OXYGEN SATURATION: 100 % | TEMPERATURE: 99 F | SYSTOLIC BLOOD PRESSURE: 129 MMHG

## 2021-02-21 DIAGNOSIS — Z34.80 ENCOUNTER FOR SUPERVISION OF OTHER NORMAL PREGNANCY, UNSPECIFIED TRIMESTER: ICD-10-CM

## 2021-02-21 DIAGNOSIS — O26.899 OTHER SPECIFIED PREGNANCY RELATED CONDITIONS, UNSPECIFIED TRIMESTER: ICD-10-CM

## 2021-02-21 DIAGNOSIS — Z3A.00 WEEKS OF GESTATION OF PREGNANCY NOT SPECIFIED: ICD-10-CM

## 2021-02-21 LAB
BASOPHILS # BLD AUTO: 0.02 K/UL — SIGNIFICANT CHANGE UP (ref 0–0.2)
BASOPHILS NFR BLD AUTO: 0.2 % — SIGNIFICANT CHANGE UP (ref 0–2)
BLD GP AB SCN SERPL QL: NEGATIVE — SIGNIFICANT CHANGE UP
EOSINOPHIL # BLD AUTO: 0.06 K/UL — SIGNIFICANT CHANGE UP (ref 0–0.5)
EOSINOPHIL NFR BLD AUTO: 0.5 % — SIGNIFICANT CHANGE UP (ref 0–6)
HCT VFR BLD CALC: 38.3 % — SIGNIFICANT CHANGE UP (ref 34.5–45)
HGB BLD-MCNC: 13.2 G/DL — SIGNIFICANT CHANGE UP (ref 11.5–15.5)
IMM GRANULOCYTES NFR BLD AUTO: 0.5 % — SIGNIFICANT CHANGE UP (ref 0–1.5)
LYMPHOCYTES # BLD AUTO: 1.26 K/UL — SIGNIFICANT CHANGE UP (ref 1–3.3)
LYMPHOCYTES # BLD AUTO: 11.4 % — LOW (ref 13–44)
MCHC RBC-ENTMCNC: 32.9 PG — SIGNIFICANT CHANGE UP (ref 27–34)
MCHC RBC-ENTMCNC: 34.5 GM/DL — SIGNIFICANT CHANGE UP (ref 32–36)
MCV RBC AUTO: 95.5 FL — SIGNIFICANT CHANGE UP (ref 80–100)
MONOCYTES # BLD AUTO: 0.98 K/UL — HIGH (ref 0–0.9)
MONOCYTES NFR BLD AUTO: 8.9 % — SIGNIFICANT CHANGE UP (ref 2–14)
NEUTROPHILS # BLD AUTO: 8.64 K/UL — HIGH (ref 1.8–7.4)
NEUTROPHILS NFR BLD AUTO: 78.5 % — HIGH (ref 43–77)
NRBC # BLD: 0 /100 WBCS — SIGNIFICANT CHANGE UP (ref 0–0)
PLATELET # BLD AUTO: 287 K/UL — SIGNIFICANT CHANGE UP (ref 150–400)
RBC # BLD: 4.01 M/UL — SIGNIFICANT CHANGE UP (ref 3.8–5.2)
RBC # FLD: 13.5 % — SIGNIFICANT CHANGE UP (ref 10.3–14.5)
RH IG SCN BLD-IMP: POSITIVE — SIGNIFICANT CHANGE UP
SARS-COV-2 IGG SERPL QL IA: NEGATIVE — SIGNIFICANT CHANGE UP
SARS-COV-2 IGM SERPL IA-ACNC: 0.06 INDEX — SIGNIFICANT CHANGE UP
SARS-COV-2 RNA SPEC QL NAA+PROBE: SIGNIFICANT CHANGE UP
T PALLIDUM AB TITR SER: NEGATIVE — SIGNIFICANT CHANGE UP
WBC # BLD: 11.01 K/UL — HIGH (ref 3.8–10.5)
WBC # FLD AUTO: 11.01 K/UL — HIGH (ref 3.8–10.5)

## 2021-02-21 PROCEDURE — 59510 CESAREAN DELIVERY: CPT

## 2021-02-21 RX ORDER — OXYCODONE HYDROCHLORIDE 5 MG/1
5 TABLET ORAL
Refills: 0 | Status: DISCONTINUED | OUTPATIENT
Start: 2021-02-21 | End: 2021-02-22

## 2021-02-21 RX ORDER — SODIUM CHLORIDE 9 MG/ML
1000 INJECTION, SOLUTION INTRAVENOUS
Refills: 0 | Status: DISCONTINUED | OUTPATIENT
Start: 2021-02-21 | End: 2021-02-21

## 2021-02-21 RX ORDER — CITRIC ACID/SODIUM CITRATE 300-500 MG
15 SOLUTION, ORAL ORAL EVERY 6 HOURS
Refills: 0 | Status: DISCONTINUED | OUTPATIENT
Start: 2021-02-21 | End: 2021-02-22

## 2021-02-21 RX ORDER — NALOXONE HYDROCHLORIDE 4 MG/.1ML
0.1 SPRAY NASAL
Refills: 0 | Status: DISCONTINUED | OUTPATIENT
Start: 2021-02-21 | End: 2021-02-22

## 2021-02-21 RX ORDER — AMPICILLIN TRIHYDRATE 250 MG
1 CAPSULE ORAL EVERY 4 HOURS
Refills: 0 | Status: DISCONTINUED | OUTPATIENT
Start: 2021-02-21 | End: 2021-02-22

## 2021-02-21 RX ORDER — DEXAMETHASONE 0.5 MG/5ML
4 ELIXIR ORAL EVERY 6 HOURS
Refills: 0 | Status: DISCONTINUED | OUTPATIENT
Start: 2021-02-21 | End: 2021-02-22

## 2021-02-21 RX ORDER — FAMOTIDINE 10 MG/ML
20 INJECTION INTRAVENOUS ONCE
Refills: 0 | Status: COMPLETED | OUTPATIENT
Start: 2021-02-21 | End: 2021-02-23

## 2021-02-21 RX ORDER — ONDANSETRON 8 MG/1
4 TABLET, FILM COATED ORAL EVERY 6 HOURS
Refills: 0 | Status: DISCONTINUED | OUTPATIENT
Start: 2021-02-21 | End: 2021-02-22

## 2021-02-21 RX ORDER — MORPHINE SULFATE 50 MG/1
2 CAPSULE, EXTENDED RELEASE ORAL ONCE
Refills: 0 | Status: DISCONTINUED | OUTPATIENT
Start: 2021-02-21 | End: 2021-02-22

## 2021-02-21 RX ORDER — ACETAMINOPHEN 500 MG
1000 TABLET ORAL ONCE
Refills: 0 | Status: COMPLETED | OUTPATIENT
Start: 2021-02-21 | End: 2021-02-21

## 2021-02-21 RX ORDER — SODIUM CHLORIDE 9 MG/ML
1000 INJECTION INTRAMUSCULAR; INTRAVENOUS; SUBCUTANEOUS
Refills: 0 | Status: DISCONTINUED | OUTPATIENT
Start: 2021-02-21 | End: 2021-02-21

## 2021-02-21 RX ORDER — SODIUM CHLORIDE 9 MG/ML
300 INJECTION INTRAMUSCULAR; INTRAVENOUS; SUBCUTANEOUS ONCE
Refills: 0 | Status: DISCONTINUED | OUTPATIENT
Start: 2021-02-21 | End: 2021-02-21

## 2021-02-21 RX ORDER — OXYTOCIN 10 UNIT/ML
2 VIAL (ML) INJECTION
Qty: 30 | Refills: 0 | Status: DISCONTINUED | OUTPATIENT
Start: 2021-02-21 | End: 2021-02-21

## 2021-02-21 RX ORDER — NALBUPHINE HYDROCHLORIDE 10 MG/ML
2.5 INJECTION, SOLUTION INTRAMUSCULAR; INTRAVENOUS; SUBCUTANEOUS EVERY 6 HOURS
Refills: 0 | Status: DISCONTINUED | OUTPATIENT
Start: 2021-02-21 | End: 2021-02-22

## 2021-02-21 RX ORDER — CITRIC ACID/SODIUM CITRATE 300-500 MG
30 SOLUTION, ORAL ORAL ONCE
Refills: 0 | Status: DISCONTINUED | OUTPATIENT
Start: 2021-02-21 | End: 2021-02-24

## 2021-02-21 RX ORDER — OXYTOCIN 10 UNIT/ML
333.33 VIAL (ML) INJECTION
Qty: 20 | Refills: 0 | Status: DISCONTINUED | OUTPATIENT
Start: 2021-02-21 | End: 2021-02-24

## 2021-02-21 RX ORDER — METOCLOPRAMIDE HCL 10 MG
10 TABLET ORAL ONCE
Refills: 0 | Status: DISCONTINUED | OUTPATIENT
Start: 2021-02-21 | End: 2021-02-24

## 2021-02-21 RX ORDER — AMPICILLIN TRIHYDRATE 250 MG
2 CAPSULE ORAL ONCE
Refills: 0 | Status: COMPLETED | OUTPATIENT
Start: 2021-02-21 | End: 2021-02-21

## 2021-02-21 RX ORDER — GENTAMICIN SULFATE 40 MG/ML
300 VIAL (ML) INJECTION ONCE
Refills: 0 | Status: COMPLETED | OUTPATIENT
Start: 2021-02-21 | End: 2021-02-21

## 2021-02-21 RX ORDER — INFLUENZA VIRUS VACCINE 15; 15; 15; 15 UG/.5ML; UG/.5ML; UG/.5ML; UG/.5ML
0.5 SUSPENSION INTRAMUSCULAR ONCE
Refills: 0 | Status: COMPLETED | OUTPATIENT
Start: 2021-02-21 | End: 2021-02-21

## 2021-02-21 RX ADMIN — Medication 2 MILLIUNIT(S)/MIN: at 09:26

## 2021-02-21 RX ADMIN — Medication 216 GRAM(S): at 21:30

## 2021-02-21 RX ADMIN — Medication 50 MILLIGRAM(S): at 21:30

## 2021-02-21 RX ADMIN — Medication 400 MILLIGRAM(S): at 21:30

## 2021-02-21 RX ADMIN — Medication 15 MILLILITER(S): at 18:10

## 2021-02-21 NOTE — OB RN DELIVERY SUMMARY - NS_LABORCHARACTER_OBGYN_ALL_OB
Induction of labor-Medicinal/Other - excessive bleeding/Febrile (>38C)/External electronic FM/Antibiotics in labor/Fetal intolerance

## 2021-02-21 NOTE — OB PROVIDER LABOR PROGRESS NOTE - NS_OBIHICONTRACTIONPATTERNDETAILS_OBGYN_ALL_OB_FT
toco q 4- 5 mins
[place] q 2-4 mins
q 3-4 min
q 3-4 mins
[place] q 4-5 mins
q 2-3 mins, inadequate montevideo units, when pitocin increased FHR shows distress (variables and lates)
q2-3min

## 2021-02-21 NOTE — OB RN DELIVERY SUMMARY - NS_SEPSISRSKCALC_OBGYN_ALL_OB_FT
EOS calculated successfully. EOS Risk Factor: 0.5/1000 live births ([facility] national incidence); GA=38w3d; Temp=100.76; ROM=20.8; GBS='Negative'; Antibiotics='No antibiotics or any antibiotics < 2 hrs prior to birth'

## 2021-02-21 NOTE — OB PROVIDER LABOR PROGRESS NOTE - NS_OBIHIFHRDETAILS_OBGYN_ALL_OB_FT
150, mod varaibility, +acels
140, mod variaiblity, +acels, occ decels/ variables, reassuring tracing
150, mod variability, +acels
130, mod variaiblity, +acels, +earlys, +variable, cat 2 tracing, reassuring
135, mod variability, +Acels
recovered to baseline 130, +acels, +viability
135/mod/+accel/recurrent variable decels

## 2021-02-21 NOTE — OB PROVIDER H&P - HISTORY OF PRESENT ILLNESS
Admission H&P    Subjective  HPI: 31yyo  @38.3 presents w/LOF since 3AM and "back pain that might be contractions". Patient denies complications in pregnancy. +FM. -VB. Pt denies any other concerns.    – PNC: Denies prenatal issues. GBS negative.  EFW 3800g by sono.  – OBHx: G1 uncomplicated  – GynHx: denies h/o fibroids, cysts, abnormal paps, STDs including HSV  – PMH: denies  – PSH: denies  – Psych: denies h/o anxiety or depression  – Social: denies t/a/d in pregnancy  – Meds: PNV   – Allergies: NKDA  – Will accept blood transfusions? Yes    Objective  T(C): 37.1 (21 @ 05:22), Max: 37.1 (21 @ 05:07)  HR: 101 (21 @ 07:13) (79 - 106)  BP: 129/84 (21 @ 05:22) (129/84 - 129/84)  RR: 18 (21 @ 05:22) (18 - 18)  SpO2: 98% (21 @ 07:13) (89% - 100%)      – PE:   CV: RRR  Pulm: breathing comfortably on RA  Abd: gravid, nontender  Extr: moving all extremities with ease  – Spec: grossly ruptured: +pooling, +nitrazine  – VE: 0/0/-3  – FHT: baseline 140, mod variability, +accels, -decels  – [place]: irregular  – EFW: 3800g by sono  – Sono: vertex    Assessment  30yo  @38.3 presents w/LOF, not in labor.    Plan  1. Admit to LND. Routine Labs. IVF.  2. IOL w/vaginal cytotec  3. Fetus: cat 1 tracing. VTX. EFW 3800g by sono. Continuous EFM. Sono. No concerns.  4. Prenatal issues: none  5. GBS negative  6. Pain: IV pain meds/epidural per attending     D/w Dr. Reveles.

## 2021-02-21 NOTE — OB PROVIDER H&P - ASSESSMENT
Assessment  32yo  @38.3 presents w/LOF, not in labor.    Plan  1. Admit to LND. Routine Labs. IVF.  2. IOL w/vaginal cytotec  3. Fetus: cat 1 tracing. VTX. EFW 3800g by sono. Continuous EFM. Sono. No concerns.  4. Prenatal issues: none  5. GBS negative  6. Pain: IV pain meds/epidural per attending     D/w Dr. Reveles.    KATH Bearden, PGY1

## 2021-02-21 NOTE — OB PROVIDER LABOR PROGRESS NOTE - ASSESSMENT
32yo  IOL 2/2 PROM, stable and progressing well. Examined for cat 2 FHT that restored to cat 1 after resuscitative measures.  - IUPC placed, amnioninfusion started  - FHT cat 1 while patient positioned on back  - Continue IOL w/ pitocin  - GBS neg  - Pain control: epidural effective    D/w Dr. Everarod Rosenbaum, PGY1
cont plan of care, reassuring tracing, mod variability +acels  
pt reevaluated will start pitocin for augmentation, consented for delivery, operative delivery, possible  section 
cont pitocin, epidural reinforcement PRN
reassuring tracing, continue current management plan 
Pt and  spoken to at bedside, IPT with no cervical change since 540pm (approx 5 hours), unable to titrate pitocin higher, discussed that with IPT and inability to titrate pitocin recommended  section, pt and  in agreement with plan of care, discussed risks, benefits, alternatives. offered to continue augmentation but pt and  in agreement with  section, 
thickened lip on pt right side, pitocin paused, continue amnioinfusion, FHR now recovered, if reassuring tracing will restart in 30 mins

## 2021-02-21 NOTE — OB RN DELIVERY SUMMARY - BABY A: STOOL IN DELIVERY
Keep nasal packing in place for 2 days. Follow up with ENT in 2 days for packing removal. If for whatever reason you are unable to see ENT, please return to ED for packing removal.  Avoid nose blowing.  Take tylenol 650mg every 4-6 hours as needed for pain.   Return to ER for new or worsened symptoms including fever, chills, severe headache, bleeding, or any concern.
yes

## 2021-02-21 NOTE — OB NEONATOLOGY/PEDIATRICIAN DELIVERY SUMMARY - NSPEDSNEONOTESA_OBGYN_ALL_OB_FT
38.3 wk male born via primary CS for failed labor progression to a 32y/o  blood type A+ mother. Maternal history of PCOS. Prenatal history of IVF pregnancy. PNL -/-/NR/I, GBS - on . SROM at 2:40 with clear fluids. Baby emerged vigorous, crying, was w/d/s/s with APGARS of 9/9. Mom plans to initiate breastfeeding, declines Hep B vaccine and declines circ.  EOS 1.34. Highest maternal temp 38.2 at 21:00. Ampicillin and gentamicin given at 21:30 <2 hours prior to delivery. COVID negative.

## 2021-02-21 NOTE — OB PROVIDER H&P - ATTENDING COMMENTS
Patient seen at bedside, consented for delivery, reexamined 1/90, will start pitocin for augmentation

## 2021-02-21 NOTE — OB PROVIDER LABOR PROGRESS NOTE - NS_SUBJECTIVE/OBJECTIVE_OBGYN_ALL_OB_FT
pt reevaluated
at pt bedside for decel
Patient seen and examined at bedside for recurrent variable decels.     Vital Signs Last 24 Hrs  T(C): 37.0 (21 Feb 2021 13:07), Max: 37.2 (21 Feb 2021 07:35)  T(F): 98.6 (21 Feb 2021 13:07), Max: 98.96 (21 Feb 2021 07:35)  HR: 90 (21 Feb 2021 14:27) (71 - 120)  BP: 101/64 (21 Feb 2021 14:12) (90/54 - 131/84)  RR: 18 (21 Feb 2021 05:22) (18 - 18)  SpO2: 100% (21 Feb 2021 14:27) (89% - 100%)
pt reevaluated , IUPC in place with amnioinfusion
Patient reevaluated early decels on tracing c/o pain
reeval after epidural

## 2021-02-21 NOTE — OB PROVIDER LABOR PROGRESS NOTE - NSVAGINALEXAM_OBGYN_ALL_OB_DT
21-Feb-2021 16:03
21-Feb-2021 09:22
21-Feb-2021 11:56
21-Feb-2021 14:30
21-Feb-2021 19:46
21-Feb-2021 22:30
21-Feb-2021 17:40

## 2021-02-22 LAB
HCT VFR BLD CALC: 34.8 % — SIGNIFICANT CHANGE UP (ref 34.5–45)
HGB BLD-MCNC: 11.4 G/DL — LOW (ref 11.5–15.5)
MCHC RBC-ENTMCNC: 32 PG — SIGNIFICANT CHANGE UP (ref 27–34)
MCHC RBC-ENTMCNC: 32.8 GM/DL — SIGNIFICANT CHANGE UP (ref 32–36)
MCV RBC AUTO: 97.8 FL — SIGNIFICANT CHANGE UP (ref 80–100)
NRBC # BLD: 0 /100 WBCS — SIGNIFICANT CHANGE UP (ref 0–0)
PLATELET # BLD AUTO: 245 K/UL — SIGNIFICANT CHANGE UP (ref 150–400)
RBC # BLD: 3.56 M/UL — LOW (ref 3.8–5.2)
RBC # FLD: 13.7 % — SIGNIFICANT CHANGE UP (ref 10.3–14.5)
WBC # BLD: 17.38 K/UL — HIGH (ref 3.8–10.5)
WBC # FLD AUTO: 17.38 K/UL — HIGH (ref 3.8–10.5)

## 2021-02-22 RX ORDER — LANOLIN
1 OINTMENT (GRAM) TOPICAL EVERY 6 HOURS
Refills: 0 | Status: DISCONTINUED | OUTPATIENT
Start: 2021-02-22 | End: 2021-02-24

## 2021-02-22 RX ORDER — OXYTOCIN 10 UNIT/ML
333.33 VIAL (ML) INJECTION
Qty: 20 | Refills: 0 | Status: DISCONTINUED | OUTPATIENT
Start: 2021-02-22 | End: 2021-02-24

## 2021-02-22 RX ORDER — KETOROLAC TROMETHAMINE 30 MG/ML
30 SYRINGE (ML) INJECTION EVERY 6 HOURS
Refills: 0 | Status: DISCONTINUED | OUTPATIENT
Start: 2021-02-22 | End: 2021-02-23

## 2021-02-22 RX ORDER — OXYCODONE HYDROCHLORIDE 5 MG/1
5 TABLET ORAL
Refills: 0 | Status: COMPLETED | OUTPATIENT
Start: 2021-02-22 | End: 2021-03-01

## 2021-02-22 RX ORDER — HEPARIN SODIUM 5000 [USP'U]/ML
5000 INJECTION INTRAVENOUS; SUBCUTANEOUS EVERY 12 HOURS
Refills: 0 | Status: DISCONTINUED | OUTPATIENT
Start: 2021-02-22 | End: 2021-02-24

## 2021-02-22 RX ORDER — IBUPROFEN 200 MG
600 TABLET ORAL EVERY 6 HOURS
Refills: 0 | Status: COMPLETED | OUTPATIENT
Start: 2021-02-22 | End: 2022-01-21

## 2021-02-22 RX ORDER — MAGNESIUM HYDROXIDE 400 MG/1
30 TABLET, CHEWABLE ORAL
Refills: 0 | Status: DISCONTINUED | OUTPATIENT
Start: 2021-02-22 | End: 2021-02-24

## 2021-02-22 RX ORDER — GENTAMICIN SULFATE 40 MG/ML
220 VIAL (ML) INJECTION ONCE
Refills: 0 | Status: COMPLETED | OUTPATIENT
Start: 2021-02-22 | End: 2021-02-22

## 2021-02-22 RX ORDER — GENTAMICIN SULFATE 40 MG/ML
80 VIAL (ML) INJECTION ONCE
Refills: 0 | Status: COMPLETED | OUTPATIENT
Start: 2021-02-22 | End: 2021-02-21

## 2021-02-22 RX ORDER — SIMETHICONE 80 MG/1
80 TABLET, CHEWABLE ORAL EVERY 4 HOURS
Refills: 0 | Status: DISCONTINUED | OUTPATIENT
Start: 2021-02-22 | End: 2021-02-24

## 2021-02-22 RX ORDER — DIPHENHYDRAMINE HCL 50 MG
25 CAPSULE ORAL EVERY 6 HOURS
Refills: 0 | Status: DISCONTINUED | OUTPATIENT
Start: 2021-02-22 | End: 2021-02-24

## 2021-02-22 RX ORDER — OXYCODONE HYDROCHLORIDE 5 MG/1
5 TABLET ORAL ONCE
Refills: 0 | Status: DISCONTINUED | OUTPATIENT
Start: 2021-02-22 | End: 2021-02-24

## 2021-02-22 RX ORDER — TETANUS TOXOID, REDUCED DIPHTHERIA TOXOID AND ACELLULAR PERTUSSIS VACCINE, ADSORBED 5; 2.5; 8; 8; 2.5 [IU]/.5ML; [IU]/.5ML; UG/.5ML; UG/.5ML; UG/.5ML
0.5 SUSPENSION INTRAMUSCULAR ONCE
Refills: 0 | Status: DISCONTINUED | OUTPATIENT
Start: 2021-02-22 | End: 2021-02-24

## 2021-02-22 RX ORDER — SODIUM CHLORIDE 9 MG/ML
1000 INJECTION, SOLUTION INTRAVENOUS
Refills: 0 | Status: DISCONTINUED | OUTPATIENT
Start: 2021-02-22 | End: 2021-02-24

## 2021-02-22 RX ORDER — OXYCODONE HYDROCHLORIDE 5 MG/1
5 TABLET ORAL ONCE
Refills: 0 | Status: DISCONTINUED | OUTPATIENT
Start: 2021-02-22 | End: 2021-02-23

## 2021-02-22 RX ORDER — ACETAMINOPHEN 500 MG
975 TABLET ORAL
Refills: 0 | Status: DISCONTINUED | OUTPATIENT
Start: 2021-02-22 | End: 2021-02-24

## 2021-02-22 RX ADMIN — Medication 100 MILLIGRAM(S): at 16:52

## 2021-02-22 RX ADMIN — Medication 30 MILLIGRAM(S): at 12:10

## 2021-02-22 RX ADMIN — Medication 500 MILLIGRAM(S): at 02:04

## 2021-02-22 RX ADMIN — Medication 100 MILLIGRAM(S): at 01:35

## 2021-02-22 RX ADMIN — Medication 975 MILLIGRAM(S): at 04:01

## 2021-02-22 RX ADMIN — SODIUM CHLORIDE 125 MILLILITER(S): 9 INJECTION, SOLUTION INTRAVENOUS at 12:09

## 2021-02-22 RX ADMIN — Medication 0.2 MILLIGRAM(S): at 04:01

## 2021-02-22 RX ADMIN — Medication 30 MILLIGRAM(S): at 06:39

## 2021-02-22 RX ADMIN — Medication 975 MILLIGRAM(S): at 20:32

## 2021-02-22 RX ADMIN — Medication 100 MILLIGRAM(S): at 09:03

## 2021-02-22 RX ADMIN — HEPARIN SODIUM 5000 UNIT(S): 5000 INJECTION INTRAVENOUS; SUBCUTANEOUS at 17:53

## 2021-02-22 RX ADMIN — Medication 975 MILLIGRAM(S): at 09:13

## 2021-02-22 RX ADMIN — Medication 0.2 MILLIGRAM(S): at 12:10

## 2021-02-22 RX ADMIN — OXYCODONE HYDROCHLORIDE 5 MILLIGRAM(S): 5 TABLET ORAL at 06:39

## 2021-02-22 RX ADMIN — Medication 0.2 MILLIGRAM(S): at 16:52

## 2021-02-22 RX ADMIN — HEPARIN SODIUM 5000 UNIT(S): 5000 INJECTION INTRAVENOUS; SUBCUTANEOUS at 06:39

## 2021-02-22 RX ADMIN — NALBUPHINE HYDROCHLORIDE 2.5 MILLIGRAM(S): 10 INJECTION, SOLUTION INTRAMUSCULAR; INTRAVENOUS; SUBCUTANEOUS at 09:02

## 2021-02-22 RX ADMIN — Medication 975 MILLIGRAM(S): at 14:30

## 2021-02-22 RX ADMIN — Medication 0.2 MILLIGRAM(S): at 08:15

## 2021-02-22 RX ADMIN — OXYCODONE HYDROCHLORIDE 5 MILLIGRAM(S): 5 TABLET ORAL at 01:31

## 2021-02-22 RX ADMIN — OXYCODONE HYDROCHLORIDE 5 MILLIGRAM(S): 5 TABLET ORAL at 14:30

## 2021-02-22 RX ADMIN — Medication 30 MILLIGRAM(S): at 17:53

## 2021-02-22 RX ADMIN — Medication 0.2 MILLIGRAM(S): at 20:32

## 2021-02-22 NOTE — PROGRESS NOTE ADULT - ASSESSMENT
A: Patient is a 30yo  POD#1 s/p pLTCS with course c/b chorioamnionitis and uterine atony. Patient has been afebrile, bleeding is wnl, and she feels well.     Plan:   - continue abx x1 dose each   - continue methergine series  - continue current pain regimen  - continue regular diet  - increase ambulation     KATH Bearden, PGY1 A: Patient is a 32yo  POD#1 s/p pLTCS with course c/b chorioamnionitis and uterine atony. Patient has been afebrile, bleeding is wnl, and she feels well.     Plan:   - D/c mcguire this AM  - continue abx x1 dose each   - continue methergine series  - continue current pain regimen  - continue regular diet  - increase ambulation     KATH Bearden, PGY1

## 2021-02-22 NOTE — PROGRESS NOTE ADULT - SUBJECTIVE AND OBJECTIVE BOX
Day 1 of Anesthesia Pain Management Service    SUBJECTIVE:  Pain Scale Score:          [X] Refer to charted pain scores    THERAPY: Received PF epidural morphine as above    OBJECTIVE:    Sedation:        	[X] Alert	[ ] Drowsy	[ ] Arousable      [ ] Asleep       [ ] Unresponsive    Side Effects:	[X] None	[ ] Nausea	[ ] Vomiting         [ ] Pruritus  		[ ] Weakness            [ ] Numbness	          [ ] Other:    ASSESSMENT/ PLAN  [X] Patient transitioned to prn analgesics  [X] Pain management per primary service, pain service to sign off   [X]Documentation and Verification of current medications

## 2021-02-22 NOTE — PROGRESS NOTE ADULT - SUBJECTIVE AND OBJECTIVE BOX
Day 1 of Anesthesia Pain Management Service    SUBJECTIVE: Doings ok  Pain Scale Score:          [X] Refer to charted pain scores    THERAPY:  s/p   2  mg PF epidural morphine on 2\21\2021      MEDICATIONS  (STANDING):  acetaminophen   Tablet .. 975 milliGRAM(s) Oral <User Schedule>  citric acid/sodium citrate Solution 30 milliLiter(s) Oral once  clindamycin IVPB 900 milliGRAM(s) IV Intermittent every 8 hours  diphtheria/tetanus/pertussis (acellular) Vaccine (ADAcel) 0.5 milliLiter(s) IntraMuscular once  famotidine Injectable 20 milliGRAM(s) IV Push once  heparin   Injectable 5000 Unit(s) SubCutaneous every 12 hours  ibuprofen  Tablet. 600 milliGRAM(s) Oral every 6 hours  influenza   Vaccine 0.5 milliLiter(s) IntraMuscular once  ketorolac   Injectable 30 milliGRAM(s) IV Push every 6 hours  lactated ringers. 1000 milliLiter(s) (125 mL/Hr) IV Continuous <Continuous>  methylergonovine 0.2 milliGRAM(s) Oral every 4 hours  metoclopramide Injectable 10 milliGRAM(s) IV Push once  morphine PF Epidural 2 milliGRAM(s) Epidural once  oxytocin Infusion 333.333 milliUNIT(s)/Min (1000 mL/Hr) IV Continuous <Continuous>  oxytocin Infusion 333.333 milliUNIT(s)/Min (1000 mL/Hr) IV Continuous <Continuous>    MEDICATIONS  (PRN):  dexAMETHasone  Injectable 4 milliGRAM(s) IV Push every 6 hours PRN Nausea  diphenhydrAMINE 25 milliGRAM(s) Oral every 6 hours PRN Pruritus  lanolin Ointment 1 Application(s) Topical every 6 hours PRN Sore Nipples  magnesium hydroxide Suspension 30 milliLiter(s) Oral two times a day PRN Constipation  nalbuphine Injectable 2.5 milliGRAM(s) IV Push every 6 hours PRN Pruritus  naloxone Injectable 0.1 milliGRAM(s) IV Push every 3 minutes PRN For ANY of the following changes in patient status:  A. Breaths Per Minute LESS THAN 10, B. Oxygen saturation LESS THAN 90%, C. Sedation score of 6 for Stop After: 4 Times  ondansetron Injectable 4 milliGRAM(s) IV Push every 6 hours PRN Nausea  oxyCODONE    IR 5 milliGRAM(s) Oral every 3 hours PRN Mild Pain (1 - 3)  oxyCODONE    IR 5 milliGRAM(s) Oral every 3 hours PRN Moderate to Severe Pain (4-10)  oxyCODONE    IR 5 milliGRAM(s) Oral once PRN Moderate to Severe Pain (4-10)  oxyCODONE    IR 5 milliGRAM(s) Oral once PRN Moderate to Severe Pain (4-10)  simethicone 80 milliGRAM(s) Chew every 4 hours PRN Gas      OBJECTIVE:    Sedation:        	[X] Alert	 [ ] Drowsy	[ ] Arousable      [ ] Asleep       [ ] Unresponsive    Side Effects:	[ ] None 	[X ] Nausea: antiemetics prn	[ ] Vomiting         [ X] Pruritus: antihistamine prn  		[ ] Weakness            [ ] Numbness	          [ ] Other:    Vital Signs Last 24 Hrs  T(C): 37.1 (22 Feb 2021 06:14), Max: 38.2 (21 Feb 2021 21:00)  T(F): 98.7 (22 Feb 2021 06:14), Max: 100.76 (21 Feb 2021 21:00)  HR: 78 (22 Feb 2021 06:14) (68 - 134)  BP: 106/75 (22 Feb 2021 06:14) (85/55 - 140/73)  BP(mean): 91 (22 Feb 2021 02:30) (87 - 99)  RR: 17 (22 Feb 2021 06:14) (16 - 20)  SpO2: 96% (22 Feb 2021 06:14) (85% - 100%)    ASSESSMENT/ PLAN  [X] Patient transitioned to prn analgesics  [X] Pain management per primary service, pain service to sign off   [X]Documentation and Verification of current medications

## 2021-02-22 NOTE — OB PROVIDER DELIVERY SUMMARY - NSPROVIDERDELIVERYNOTE_OBGYN_ALL_OB_FT
Patient admitted for PROM induction c/w intrapartum infection.  Taken for pLTCS for arrest of dilatation. Live male  delivery via lower uterine segment incision, acynclitic head w/ assistance of mityvac. Apgars 9/9. Weight= 3550g, cephalic presentation. Cord clamped and cut. Cord gas sent. Placenta delivered intact. Hysterotomy closed with caprosyn.  Peritoneum closed w/ chromic.  Rectus reapproximated with 2-0 chromic. Fascia closed with 0-vicryl. Subcutaneous tissued closed w/ plain gut. Skin closed with monocryl.  IVF 1800 mL, UOP 900cc.

## 2021-02-22 NOTE — PROGRESS NOTE ADULT - SUBJECTIVE AND OBJECTIVE BOX
R1 Progress Note    Patient is a 30yo  POD#1 s/p pLTCS with intrapartum course c/b chorioamnionitis. Patient is currently being treated with gentamycin and clindamycin. No acute overnight events. Patient has no acute complaints, pain well controlled. Patient is ambulating and tolerating regular diet, voiding spontaneously and passing flatus. Denies CP, SOB, N/V, HA, blurred vision, epigastric pain.    Vital Signs Last 24 Hours  T(C): 37.1 (21 @ 06:14), Max: 38.2 (21 @ 21:00)  HR: 78 (21 @ 06:14) (68 - 134)  BP: 106/75 (21 @ 06:14) (85/55 - 140/73)  RR: 17 (21 @ 06:14) (16 - 20)  SpO2: 96% (21 @ 06:14) (85% - 100%)    I&O's Summary    2021 07:01  -  2021 07:00  --------------------------------------------------------  IN: 6200 mL / OUT: 5500 mL / NET: 700 mL        Physical Exam:  General: NAD  Abdomen: Soft, appropriately tender, non-distended, fundus firm  Incision: Dressing removed. Pfannenstiel incision CDI, subcuticular suture closure  Pelvic: Lochia wnl    Labs:    Blood Type: A Positive  Antibody Screen: Negative  RPR: Negative               11.4   17.38 )-----------( 245      (  @ 06:48 )             34.8                13.2   11.01 )-----------( 287      (  @ 07:13 )             38.3         MEDICATIONS  (STANDING):  acetaminophen   Tablet .. 975 milliGRAM(s) Oral <User Schedule>  citric acid/sodium citrate Solution 30 milliLiter(s) Oral once  clindamycin IVPB 900 milliGRAM(s) IV Intermittent every 8 hours  diphtheria/tetanus/pertussis (acellular) Vaccine (ADAcel) 0.5 milliLiter(s) IntraMuscular once  famotidine Injectable 20 milliGRAM(s) IV Push once  heparin   Injectable 5000 Unit(s) SubCutaneous every 12 hours  ibuprofen  Tablet. 600 milliGRAM(s) Oral every 6 hours  influenza   Vaccine 0.5 milliLiter(s) IntraMuscular once  ketorolac   Injectable 30 milliGRAM(s) IV Push every 6 hours  lactated ringers. 1000 milliLiter(s) (125 mL/Hr) IV Continuous <Continuous>  methylergonovine 0.2 milliGRAM(s) Oral every 4 hours  metoclopramide Injectable 10 milliGRAM(s) IV Push once  morphine PF Epidural 2 milliGRAM(s) Epidural once  oxytocin Infusion 333.333 milliUNIT(s)/Min (1000 mL/Hr) IV Continuous <Continuous>  oxytocin Infusion 333.333 milliUNIT(s)/Min (1000 mL/Hr) IV Continuous <Continuous>    MEDICATIONS  (PRN):  dexAMETHasone  Injectable 4 milliGRAM(s) IV Push every 6 hours PRN Nausea  diphenhydrAMINE 25 milliGRAM(s) Oral every 6 hours PRN Pruritus  lanolin Ointment 1 Application(s) Topical every 6 hours PRN Sore Nipples  magnesium hydroxide Suspension 30 milliLiter(s) Oral two times a day PRN Constipation  nalbuphine Injectable 2.5 milliGRAM(s) IV Push every 6 hours PRN Pruritus  naloxone Injectable 0.1 milliGRAM(s) IV Push every 3 minutes PRN For ANY of the following changes in patient status:  A. Breaths Per Minute LESS THAN 10, B. Oxygen saturation LESS THAN 90%, C. Sedation score of 6 for Stop After: 4 Times  ondansetron Injectable 4 milliGRAM(s) IV Push every 6 hours PRN Nausea  oxyCODONE    IR 5 milliGRAM(s) Oral every 3 hours PRN Mild Pain (1 - 3)  oxyCODONE    IR 5 milliGRAM(s) Oral every 3 hours PRN Moderate to Severe Pain (4-10)  oxyCODONE    IR 5 milliGRAM(s) Oral once PRN Moderate to Severe Pain (4-10)  oxyCODONE    IR 5 milliGRAM(s) Oral once PRN Moderate to Severe Pain (4-10)  simethicone 80 milliGRAM(s) Chew every 4 hours PRN Gas       R1 Progress Note    Patient is a 30yo  POD#1 s/p pLTCS with intrapartum course c/b chorioamnionitis. Patient is currently being treated with gentamycin and clindamycin. No acute overnight events. Patient has no acute complaints, pain well controlled. Burch in place. Patient is ambulating and tolerating regular diet. Denies CP, SOB, N/V, HA, blurred vision, epigastric pain.    Vital Signs Last 24 Hours  T(C): 37.1 (21 @ 06:14), Max: 38.2 (21 @ 21:00)  HR: 78 (21 @ 06:14) (68 - 134)  BP: 106/75 (21 @ 06:14) (85/55 - 140/73)  RR: 17 (21 @ 06:14) (16 - 20)  SpO2: 96% (21 @ 06:14) (85% - 100%)    I&O's Summary    2021 07:01  -  2021 07:00  --------------------------------------------------------  IN: 6200 mL / OUT: 5500 mL / NET: 700 mL        Physical Exam:  General: NAD  Abdomen: Soft, appropriately tender, non-distended, fundus firm  Incision: Dressing removed. Pfannenstiel incision CDI, subcuticular suture closure  Pelvic: Lochia wnl    Labs:    Blood Type: A Positive  Antibody Screen: Negative  RPR: Negative               11.4   17.38 )-----------( 245      (  @ 06:48 )             34.8                13.2   11.01 )-----------( 287      (  @ 07:13 )             38.3         MEDICATIONS  (STANDING):  acetaminophen   Tablet .. 975 milliGRAM(s) Oral <User Schedule>  citric acid/sodium citrate Solution 30 milliLiter(s) Oral once  clindamycin IVPB 900 milliGRAM(s) IV Intermittent every 8 hours  diphtheria/tetanus/pertussis (acellular) Vaccine (ADAcel) 0.5 milliLiter(s) IntraMuscular once  famotidine Injectable 20 milliGRAM(s) IV Push once  heparin   Injectable 5000 Unit(s) SubCutaneous every 12 hours  ibuprofen  Tablet. 600 milliGRAM(s) Oral every 6 hours  influenza   Vaccine 0.5 milliLiter(s) IntraMuscular once  ketorolac   Injectable 30 milliGRAM(s) IV Push every 6 hours  lactated ringers. 1000 milliLiter(s) (125 mL/Hr) IV Continuous <Continuous>  methylergonovine 0.2 milliGRAM(s) Oral every 4 hours  metoclopramide Injectable 10 milliGRAM(s) IV Push once  morphine PF Epidural 2 milliGRAM(s) Epidural once  oxytocin Infusion 333.333 milliUNIT(s)/Min (1000 mL/Hr) IV Continuous <Continuous>  oxytocin Infusion 333.333 milliUNIT(s)/Min (1000 mL/Hr) IV Continuous <Continuous>    MEDICATIONS  (PRN):  dexAMETHasone  Injectable 4 milliGRAM(s) IV Push every 6 hours PRN Nausea  diphenhydrAMINE 25 milliGRAM(s) Oral every 6 hours PRN Pruritus  lanolin Ointment 1 Application(s) Topical every 6 hours PRN Sore Nipples  magnesium hydroxide Suspension 30 milliLiter(s) Oral two times a day PRN Constipation  nalbuphine Injectable 2.5 milliGRAM(s) IV Push every 6 hours PRN Pruritus  naloxone Injectable 0.1 milliGRAM(s) IV Push every 3 minutes PRN For ANY of the following changes in patient status:  A. Breaths Per Minute LESS THAN 10, B. Oxygen saturation LESS THAN 90%, C. Sedation score of 6 for Stop After: 4 Times  ondansetron Injectable 4 milliGRAM(s) IV Push every 6 hours PRN Nausea  oxyCODONE    IR 5 milliGRAM(s) Oral every 3 hours PRN Mild Pain (1 - 3)  oxyCODONE    IR 5 milliGRAM(s) Oral every 3 hours PRN Moderate to Severe Pain (4-10)  oxyCODONE    IR 5 milliGRAM(s) Oral once PRN Moderate to Severe Pain (4-10)  oxyCODONE    IR 5 milliGRAM(s) Oral once PRN Moderate to Severe Pain (4-10)  simethicone 80 milliGRAM(s) Chew every 4 hours PRN Gas

## 2021-02-23 LAB
BASOPHILS # BLD AUTO: 0.02 K/UL — SIGNIFICANT CHANGE UP (ref 0–0.2)
BASOPHILS NFR BLD AUTO: 0.1 % — SIGNIFICANT CHANGE UP (ref 0–2)
EOSINOPHIL # BLD AUTO: 0.1 K/UL — SIGNIFICANT CHANGE UP (ref 0–0.5)
EOSINOPHIL NFR BLD AUTO: 0.7 % — SIGNIFICANT CHANGE UP (ref 0–6)
HCT VFR BLD CALC: 29 % — LOW (ref 34.5–45)
HGB BLD-MCNC: 10 G/DL — LOW (ref 11.5–15.5)
IMM GRANULOCYTES NFR BLD AUTO: 0.6 % — SIGNIFICANT CHANGE UP (ref 0–1.5)
LYMPHOCYTES # BLD AUTO: 1.67 K/UL — SIGNIFICANT CHANGE UP (ref 1–3.3)
LYMPHOCYTES # BLD AUTO: 12.1 % — LOW (ref 13–44)
MCHC RBC-ENTMCNC: 33.6 PG — SIGNIFICANT CHANGE UP (ref 27–34)
MCHC RBC-ENTMCNC: 34.5 GM/DL — SIGNIFICANT CHANGE UP (ref 32–36)
MCV RBC AUTO: 97.3 FL — SIGNIFICANT CHANGE UP (ref 80–100)
MONOCYTES # BLD AUTO: 1.24 K/UL — HIGH (ref 0–0.9)
MONOCYTES NFR BLD AUTO: 9 % — SIGNIFICANT CHANGE UP (ref 2–14)
NEUTROPHILS # BLD AUTO: 10.72 K/UL — HIGH (ref 1.8–7.4)
NEUTROPHILS NFR BLD AUTO: 77.5 % — HIGH (ref 43–77)
NRBC # BLD: 0 /100 WBCS — SIGNIFICANT CHANGE UP (ref 0–0)
PLATELET # BLD AUTO: 216 K/UL — SIGNIFICANT CHANGE UP (ref 150–400)
RBC # BLD: 2.98 M/UL — LOW (ref 3.8–5.2)
RBC # FLD: 14 % — SIGNIFICANT CHANGE UP (ref 10.3–14.5)
WBC # BLD: 13.83 K/UL — HIGH (ref 3.8–10.5)
WBC # FLD AUTO: 13.83 K/UL — HIGH (ref 3.8–10.5)

## 2021-02-23 RX ORDER — IBUPROFEN 200 MG
600 TABLET ORAL EVERY 6 HOURS
Refills: 0 | Status: DISCONTINUED | OUTPATIENT
Start: 2021-02-23 | End: 2021-02-24

## 2021-02-23 RX ORDER — OXYCODONE HYDROCHLORIDE 5 MG/1
5 TABLET ORAL
Refills: 0 | Status: DISCONTINUED | OUTPATIENT
Start: 2021-02-23 | End: 2021-02-24

## 2021-02-23 RX ADMIN — OXYCODONE HYDROCHLORIDE 5 MILLIGRAM(S): 5 TABLET ORAL at 15:23

## 2021-02-23 RX ADMIN — OXYCODONE HYDROCHLORIDE 5 MILLIGRAM(S): 5 TABLET ORAL at 01:01

## 2021-02-23 RX ADMIN — MAGNESIUM HYDROXIDE 30 MILLILITER(S): 400 TABLET, CHEWABLE ORAL at 18:23

## 2021-02-23 RX ADMIN — OXYCODONE HYDROCHLORIDE 5 MILLIGRAM(S): 5 TABLET ORAL at 18:22

## 2021-02-23 RX ADMIN — FAMOTIDINE 20 MILLIGRAM(S): 10 INJECTION INTRAVENOUS at 00:26

## 2021-02-23 RX ADMIN — Medication 975 MILLIGRAM(S): at 21:40

## 2021-02-23 RX ADMIN — Medication 975 MILLIGRAM(S): at 09:12

## 2021-02-23 RX ADMIN — Medication 975 MILLIGRAM(S): at 03:01

## 2021-02-23 RX ADMIN — OXYCODONE HYDROCHLORIDE 5 MILLIGRAM(S): 5 TABLET ORAL at 12:11

## 2021-02-23 RX ADMIN — Medication 975 MILLIGRAM(S): at 15:20

## 2021-02-23 RX ADMIN — Medication 600 MILLIGRAM(S): at 18:20

## 2021-02-23 RX ADMIN — HEPARIN SODIUM 5000 UNIT(S): 5000 INJECTION INTRAVENOUS; SUBCUTANEOUS at 18:22

## 2021-02-23 RX ADMIN — MAGNESIUM HYDROXIDE 30 MILLILITER(S): 400 TABLET, CHEWABLE ORAL at 12:31

## 2021-02-23 RX ADMIN — HEPARIN SODIUM 5000 UNIT(S): 5000 INJECTION INTRAVENOUS; SUBCUTANEOUS at 05:20

## 2021-02-23 RX ADMIN — Medication 600 MILLIGRAM(S): at 23:56

## 2021-02-23 RX ADMIN — Medication 600 MILLIGRAM(S): at 05:23

## 2021-02-23 RX ADMIN — Medication 600 MILLIGRAM(S): at 12:07

## 2021-02-23 NOTE — OB PROVIDER H&P - ANESTHESIA, PREVIOUS REACTION, PROFILE
[facility] Call Center    Phone Message    May a detailed message be left on voicemail: yes     Reason for Call: Medication Question or concern regarding medication   Prescription Clarification  Name of Medication: farmers thyroid  and synthroid  Prescribing Provider: Dany Erazo MD   Pharmacy:      [id number] [address] [facility] [address]     What on the order needs clarification? Pt would like to switch back to Synthroid 125mcg [place] thyroid 30mg.  HIREN on prescription needs to be written on all his prescriptions per the pt request. Please call pt to discuss further.           Action Taken: Message routed to:  [facility] ([facility] Endo    Travel Screening: Not Applicable                                                                         none

## 2021-02-23 NOTE — PROGRESS NOTE ADULT - SUBJECTIVE AND OBJECTIVE BOX
OB Progress Note: LTCS, POD#2    S: 30yo POD#2 s/p LTCS. Pain well controlled, tolerating regular diet, passing faltus, voiding spontaneously, ambulating without difficulty. Denies heavy vaginal bleeding, CP/SOB, lightheadedness/dizziness, nausea/vomiting,     O:  Vitals:  Vital Signs Last 24 Hrs  T(C): 36.7 (22 Feb 2021 17:23), Max: 37.1 (22 Feb 2021 06:14)  T(F): 98 (22 Feb 2021 17:23), Max: 98.7 (22 Feb 2021 06:14)  HR: 89 (22 Feb 2021 17:23) (78 - 89)  BP: 94/63 (22 Feb 2021 17:23) (94/63 - 108/74)  BP(mean): --  RR: 18 (22 Feb 2021 17:23) (17 - 18)  SpO2: 97% (22 Feb 2021 17:23) (96% - 97%)    MEDICATIONS  (STANDING):  acetaminophen   Tablet .. 975 milliGRAM(s) Oral <User Schedule>  citric acid/sodium citrate Solution 30 milliLiter(s) Oral once  diphtheria/tetanus/pertussis (acellular) Vaccine (ADAcel) 0.5 milliLiter(s) IntraMuscular once  heparin   Injectable 5000 Unit(s) SubCutaneous every 12 hours  ibuprofen  Tablet. 600 milliGRAM(s) Oral every 6 hours  influenza   Vaccine 0.5 milliLiter(s) IntraMuscular once  lactated ringers. 1000 milliLiter(s) (125 mL/Hr) IV Continuous <Continuous>  metoclopramide Injectable 10 milliGRAM(s) IV Push once  oxytocin Infusion 333.333 milliUNIT(s)/Min (1000 mL/Hr) IV Continuous <Continuous>  oxytocin Infusion 333.333 milliUNIT(s)/Min (1000 mL/Hr) IV Continuous <Continuous>      MEDICATIONS  (PRN):  diphenhydrAMINE 25 milliGRAM(s) Oral every 6 hours PRN Pruritus  lanolin Ointment 1 Application(s) Topical every 6 hours PRN Sore Nipples  magnesium hydroxide Suspension 30 milliLiter(s) Oral two times a day PRN Constipation  oxyCODONE    IR 5 milliGRAM(s) Oral every 3 hours PRN Moderate to Severe Pain (4-10)  oxyCODONE    IR 5 milliGRAM(s) Oral once PRN Moderate to Severe Pain (4-10)  simethicone 80 milliGRAM(s) Chew every 4 hours PRN Gas      Labs:  Blood type: A Positive  Rubella IgG: RPR: Negative                          11.4<L>   17.38<H> >-----------< 245    ( 02-22 @ 06:48 )             34.8                        13.2   11.01<H> >-----------< 287    ( 02-21 @ 07:13 )             38.3            PE:  General: NAD  Abdomen: Soft, appropriately tender, Fundus firm incision c/d/i.  VE: No heavy vaginal bleeding  Extremities: No erythema, no pitting edema     OB Progress Note: LTCS, POD#2    S: 30yo POD#2 s/p LTCS. Pain well controlled, tolerating regular diet, not yet passing faltus, voiding spontaneously, ambulating without difficulty. Denies heavy vaginal bleeding, CP/SOB, lightheadedness/dizziness, nausea/vomiting,     O:  Vitals:  Vital Signs Last 24 Hrs  T(C): 36.7 (22 Feb 2021 17:23), Max: 37.1 (22 Feb 2021 06:14)  T(F): 98 (22 Feb 2021 17:23), Max: 98.7 (22 Feb 2021 06:14)  HR: 89 (22 Feb 2021 17:23) (78 - 89)  BP: 94/63 (22 Feb 2021 17:23) (94/63 - 108/74)  BP(mean): --  RR: 18 (22 Feb 2021 17:23) (17 - 18)  SpO2: 97% (22 Feb 2021 17:23) (96% - 97%)    MEDICATIONS  (STANDING):  acetaminophen   Tablet .. 975 milliGRAM(s) Oral <User Schedule>  citric acid/sodium citrate Solution 30 milliLiter(s) Oral once  diphtheria/tetanus/pertussis (acellular) Vaccine (ADAcel) 0.5 milliLiter(s) IntraMuscular once  heparin   Injectable 5000 Unit(s) SubCutaneous every 12 hours  ibuprofen  Tablet. 600 milliGRAM(s) Oral every 6 hours  influenza   Vaccine 0.5 milliLiter(s) IntraMuscular once  lactated ringers. 1000 milliLiter(s) (125 mL/Hr) IV Continuous <Continuous>  metoclopramide Injectable 10 milliGRAM(s) IV Push once  oxytocin Infusion 333.333 milliUNIT(s)/Min (1000 mL/Hr) IV Continuous <Continuous>  oxytocin Infusion 333.333 milliUNIT(s)/Min (1000 mL/Hr) IV Continuous <Continuous>      MEDICATIONS  (PRN):  diphenhydrAMINE 25 milliGRAM(s) Oral every 6 hours PRN Pruritus  lanolin Ointment 1 Application(s) Topical every 6 hours PRN Sore Nipples  magnesium hydroxide Suspension 30 milliLiter(s) Oral two times a day PRN Constipation  oxyCODONE    IR 5 milliGRAM(s) Oral every 3 hours PRN Moderate to Severe Pain (4-10)  oxyCODONE    IR 5 milliGRAM(s) Oral once PRN Moderate to Severe Pain (4-10)  simethicone 80 milliGRAM(s) Chew every 4 hours PRN Gas      Labs:  Blood type: A Positive  Rubella IgG: RPR: Negative                          11.4<L>   17.38<H> >-----------< 245    ( 02-22 @ 06:48 )             34.8                        13.2   11.01<H> >-----------< 287    ( 02-21 @ 07:13 )             38.3            PE:  General: NAD  Abdomen: Soft, appropriately tender, Fundus firm incision c/d/i.  VE: No heavy vaginal bleeding  Extremities: No erythema, no pitting edema

## 2021-02-23 NOTE — PROGRESS NOTE ADULT - ASSESSMENT
A: Patient is a 32yo  POD#2 s/p pLTCS with course c/b chorioamnionitis s/p A/G/C/T and uterine atony s/p IM methergine and methergine series. Patient has been afebrile, bleeding is wnl, and she feels well.     Plan:   - continue current pain regimen  - continue regular diet  - increase ambulation     Julissa Cole, PGY1 A: Patient is a 30yo  POD#2 s/p pLTCS with course c/b chorioamnionitis s/p A/G/C/T and uterine atony s/p IM methergine and methergine series. Patient has been afebrile, bleeding is wnl, and she feels well.     Plan:   - continue current pain regimen  - continue regular diet  - increase ambulation   - continue to trend fever curve, afebrile for 24+hours  Julissa Cole, PGY1

## 2021-02-24 ENCOUNTER — TRANSCRIPTION ENCOUNTER (OUTPATIENT)
Age: 31
End: 2021-02-24

## 2021-02-24 ENCOUNTER — APPOINTMENT (OUTPATIENT)
Dept: OBGYN | Facility: CLINIC | Age: 31
End: 2021-02-24

## 2021-02-24 VITALS
TEMPERATURE: 98 F | DIASTOLIC BLOOD PRESSURE: 75 MMHG | OXYGEN SATURATION: 98 % | RESPIRATION RATE: 18 BRPM | SYSTOLIC BLOOD PRESSURE: 106 MMHG | HEART RATE: 102 BPM

## 2021-02-24 PROCEDURE — 86900 BLOOD TYPING SEROLOGIC ABO: CPT

## 2021-02-24 PROCEDURE — 86901 BLOOD TYPING SEROLOGIC RH(D): CPT

## 2021-02-24 PROCEDURE — 59050 FETAL MONITOR W/REPORT: CPT

## 2021-02-24 PROCEDURE — 86850 RBC ANTIBODY SCREEN: CPT

## 2021-02-24 PROCEDURE — 86780 TREPONEMA PALLIDUM: CPT

## 2021-02-24 PROCEDURE — 59025 FETAL NON-STRESS TEST: CPT

## 2021-02-24 PROCEDURE — U0005: CPT

## 2021-02-24 PROCEDURE — 85025 COMPLETE CBC W/AUTO DIFF WBC: CPT

## 2021-02-24 PROCEDURE — U0003: CPT

## 2021-02-24 PROCEDURE — 85027 COMPLETE CBC AUTOMATED: CPT

## 2021-02-24 PROCEDURE — 86769 SARS-COV-2 COVID-19 ANTIBODY: CPT

## 2021-02-24 PROCEDURE — G0463: CPT

## 2021-02-24 RX ORDER — IBUPROFEN 200 MG
1 TABLET ORAL
Qty: 0 | Refills: 0 | DISCHARGE
Start: 2021-02-24

## 2021-02-24 RX ORDER — OXYCODONE HYDROCHLORIDE 5 MG/1
1 TABLET ORAL
Qty: 12 | Refills: 0
Start: 2021-02-24 | End: 2021-02-25

## 2021-02-24 RX ORDER — ACETAMINOPHEN 500 MG
3 TABLET ORAL
Qty: 0 | Refills: 0 | DISCHARGE
Start: 2021-02-24

## 2021-02-24 RX ADMIN — Medication 975 MILLIGRAM(S): at 02:57

## 2021-02-24 RX ADMIN — HEPARIN SODIUM 5000 UNIT(S): 5000 INJECTION INTRAVENOUS; SUBCUTANEOUS at 05:42

## 2021-02-24 RX ADMIN — Medication 975 MILLIGRAM(S): at 09:24

## 2021-02-24 RX ADMIN — Medication 600 MILLIGRAM(S): at 05:40

## 2021-02-24 NOTE — DISCHARGE NOTE OB - PATIENT PORTAL LINK FT
You can access the FollowMyHealth Patient Portal offered by [facility] by registering at the following website: [email or web address]. By joining [employer]’s FollowMyHealth portal, you will also be able to view your health information using other applications (apps) compatible with our system.

## 2021-02-24 NOTE — DISCHARGE NOTE OB - HOSPITAL COURSE
section was performed without complications.  The patient met all appropriate post operative milestones.  The patient was able to void, tolerated a regular diet, and ambulated on her own.  The patient was discharged afebrile, with stable vital signs and with instructions on follow up.

## 2021-02-24 NOTE — DISCHARGE NOTE OB - CARE PLAN
Principal Discharge DX:	 delivery delivered  Goal:	Return to normal activity  Assessment and plan of treatment:	regular diet; activity as tolerated; Appt 2 weeks- please call

## 2021-02-24 NOTE — PROGRESS NOTE ADULT - SUBJECTIVE AND OBJECTIVE BOX
OB Postpartum Note:  Delivery, POD#3    S: 30yo POD#3 s/p LTCS. The patient feels well.  Pain is well controlled. She is tolerating a regular diet and passing flatus. She is voiding spontaneously, and ambulating without difficulty. Denies CP/SOB. Denies lightheadedness/dizziness. Denies N/V.    O:  Vitals:  Vital Signs Last 24 Hrs  T(C): 36.7 (2021 21:47), Max: 36.7 (2021 21:47)  T(F): 98.1 (2021 21:47), Max: 98.1 (2021 21:47)  HR: 69 (2021 21:47) (69 - 93)  BP: 111/73 (2021 21:47) (96/64 - 111/73)  BP(mean): --  RR: 18 (2021 21:47) (18 - 18)  SpO2: 99% (2021 21:47) (98% - 100%)    MEDICATIONS  (STANDING):  acetaminophen   Tablet .. 975 milliGRAM(s) Oral <User Schedule>  citric acid/sodium citrate Solution 30 milliLiter(s) Oral once  diphtheria/tetanus/pertussis (acellular) Vaccine (ADAcel) 0.5 milliLiter(s) IntraMuscular once  heparin   Injectable 5000 Unit(s) SubCutaneous every 12 hours  ibuprofen  Tablet. 600 milliGRAM(s) Oral every 6 hours  influenza   Vaccine 0.5 milliLiter(s) IntraMuscular once  lactated ringers. 1000 milliLiter(s) (125 mL/Hr) IV Continuous <Continuous>  metoclopramide Injectable 10 milliGRAM(s) IV Push once  oxytocin Infusion 333.333 milliUNIT(s)/Min (1000 mL/Hr) IV Continuous <Continuous>  oxytocin Infusion 333.333 milliUNIT(s)/Min (1000 mL/Hr) IV Continuous <Continuous>    MEDICATIONS  (PRN):  diphenhydrAMINE 25 milliGRAM(s) Oral every 6 hours PRN Pruritus  lanolin Ointment 1 Application(s) Topical every 6 hours PRN Sore Nipples  magnesium hydroxide Suspension 30 milliLiter(s) Oral two times a day PRN Constipation  oxyCODONE    IR 5 milliGRAM(s) Oral once PRN Moderate to Severe Pain (4-10)  oxyCODONE    IR 5 milliGRAM(s) Oral every 3 hours PRN Moderate to Severe Pain (4-10)  simethicone 80 milliGRAM(s) Chew every 4 hours PRN Gas      LABS:  Blood type: A Positive  Rubella IgG: RPR: Negative                          10.0<L>   13.83<H> >-----------< 216    (  @ 06:49 )             29.0<L>                        11.4<L>   17.38<H> >-----------< 245    (  @ 06:48 )             34.8                        13.2   11.01<H> >-----------< 287    (  @ 07:13 )             38.3            Physical exam:  Gen: NAD  Abdomen: Soft, nontender, no distension , firm uterine fundus  Incision: Clean, dry, and intact   Ext: No calf tenderness

## 2021-02-24 NOTE — DISCHARGE NOTE OB - MATERIALS PROVIDED
Vaccinations/[facility]  Screening Program/  Immunization Record/Breastfeeding Log/Bottle Feeding Log/Breastfeeding Mother’s Support Group Information/Guide to Postpartum Care/[facility] Hearing Screen Program/Back To Sleep Handout/Shaken Baby Prevention Handout/Breastfeeding Guide and Packet/Birth Certificate Instructions/Discharge Medication Information for Patients and Families Pocket Guide

## 2021-02-24 NOTE — DISCHARGE NOTE OB - IF BREASTFEEDING, HAVE A GLASS OF WATER, JUICE, OR LOW-FAT MILK EACH TIME YOU FEED YOUR BABY
[FreeTextEntry1] : 9 year girl found to be rapid strep positive. Complete 10 days of antibiotics. Use antipyretics as needed. Return for follow up in 2 weeks. After being on antibiotics for atleast 24 hours patient less likely to spread infection.\par  Statement Selected

## 2021-02-24 NOTE — DISCHARGE NOTE OB - CARE PROVIDER_API CALL
Priyanka Ayala)  MaternalFetal Medicine; Obstetrics and Gynecology  [address], [address]  [place] [address]  Phone: [phone]  Fax: [phone]  Follow Up Time:

## 2021-02-24 NOTE — PROGRESS NOTE ADULT - ATTENDING COMMENTS
Patient seen and examined  Agree with Above assessment and plan   pt stable d/c home
GYN Attg:  Pt seen and assessed. I agree with above assessment and plan.  BENY Millard MD
pt seen and examined. agree with above.  POD #1, s/p CD for arrest, c/b chorio, currently AF on antibiotics. overall doing well  VSS  Inc: c/d/i  FF and below umb  pain controlled with po pain meds prn.  cont antibiotics for 24hrs post-delivery  ambulation encouraged.  baby well, breast/bottle feeding  cont inhouse care

## 2021-02-24 NOTE — DISCHARGE NOTE OB - MEDICATION SUMMARY - MEDICATIONS TO TAKE
I will START or STAY ON the medications listed below when I get home from the hospital:    ibuprofen 600 mg oral tablet  -- 1 tab(s) by mouth every 6 hours  -- Indication: For  section    acetaminophen 325 mg oral tablet  -- 3 tab(s) by mouth   -- Indication: For  section    oxyCODONE 5 mg oral tablet  -- 1 tab(s) by mouth every 4 hours, As Needed for Severe Pain MDD:2  -- Caution federal law prohibits the transfer of this drug to any person other  than the person for whom it was prescribed.  It is very important that you take or use this exactly as directed.  Do not skip doses or discontinue unless directed by your doctor.  May cause drowsiness or dizziness.  This prescription cannot be refilled.  Using more of this medication than prescribed may cause serious breathing problems.    -- Indication: For  section

## 2021-03-01 ENCOUNTER — APPOINTMENT (OUTPATIENT)
Dept: OBGYN | Facility: CLINIC | Age: 31
End: 2021-03-01

## 2021-03-05 ENCOUNTER — APPOINTMENT (OUTPATIENT)
Dept: OBGYN | Facility: CLINIC | Age: 31
End: 2021-03-05
Payer: COMMERCIAL

## 2021-03-05 PROCEDURE — 0502F SUBSEQUENT PRENATAL CARE: CPT | Mod: 1L

## 2021-03-05 PROCEDURE — 0503F POSTPARTUM CARE VISIT: CPT

## 2021-03-14 ENCOUNTER — FORM ENCOUNTER (OUTPATIENT)
Age: 31
End: 2021-03-14

## 2021-03-15 ENCOUNTER — NON-APPOINTMENT (OUTPATIENT)
Age: 31
End: 2021-03-15

## 2021-03-15 ENCOUNTER — APPOINTMENT (OUTPATIENT)
Dept: OBGYN | Facility: CLINIC | Age: 31
End: 2021-03-15
Payer: COMMERCIAL

## 2021-03-15 PROCEDURE — 0503F POSTPARTUM CARE VISIT: CPT

## 2021-03-31 ENCOUNTER — FORM ENCOUNTER (OUTPATIENT)
Age: 31
End: 2021-03-31

## 2021-04-01 ENCOUNTER — APPOINTMENT (OUTPATIENT)
Dept: OBGYN | Facility: CLINIC | Age: 31
End: 2021-04-01
Payer: COMMERCIAL

## 2021-04-01 VITALS
BODY MASS INDEX: 21.9 KG/M2 | WEIGHT: 116 LBS | HEIGHT: 61 IN | SYSTOLIC BLOOD PRESSURE: 100 MMHG | DIASTOLIC BLOOD PRESSURE: 60 MMHG

## 2021-04-01 PROCEDURE — 0503F POSTPARTUM CARE VISIT: CPT

## 2021-08-30 NOTE — OB PROVIDER H&P - ATTENDING COMMENTS
Patient seen and examined  Agree with Above assessment and plan   close observation and pain mgt
No respiratory distress. No stridor, Lungs sounds clear with good aeration bilaterally.

## 2021-12-30 DIAGNOSIS — Z87.42 PERSONAL HISTORY OF OTHER DISEASES OF THE FEMALE GENITAL TRACT: ICD-10-CM

## 2021-12-30 DIAGNOSIS — Z78.9 OTHER SPECIFIED HEALTH STATUS: ICD-10-CM

## 2021-12-30 DIAGNOSIS — N91.4 SECONDARY OLIGOMENORRHEA: ICD-10-CM

## 2021-12-30 DIAGNOSIS — Z98.890 OTHER SPECIFIED POSTPROCEDURAL STATES: ICD-10-CM

## 2021-12-30 DIAGNOSIS — Z86.19 PERSONAL HISTORY OF OTHER INFECTIOUS AND PARASITIC DISEASES: ICD-10-CM

## 2021-12-30 RX ORDER — PNV NO.95/FERROUS FUM/FOLIC AC 28MG-0.8MG
TABLET ORAL
Refills: 0 | Status: ACTIVE | COMMUNITY

## 2022-01-25 ENCOUNTER — APPOINTMENT (OUTPATIENT)
Dept: OBGYN | Facility: CLINIC | Age: 32
End: 2022-01-25
Payer: COMMERCIAL

## 2022-01-25 VITALS
SYSTOLIC BLOOD PRESSURE: 98 MMHG | HEIGHT: 61 IN | WEIGHT: 114 LBS | BODY MASS INDEX: 21.52 KG/M2 | DIASTOLIC BLOOD PRESSURE: 60 MMHG

## 2022-01-25 DIAGNOSIS — Z80.3 FAMILY HISTORY OF MALIGNANT NEOPLASM OF BREAST: ICD-10-CM

## 2022-01-25 DIAGNOSIS — Z01.419 ENCOUNTER FOR GYNECOLOGICAL EXAMINATION (GENERAL) (ROUTINE) W/OUT ABNORMAL FINDINGS: ICD-10-CM

## 2022-01-25 PROCEDURE — 99395 PREV VISIT EST AGE 18-39: CPT

## 2022-01-25 NOTE — HISTORY OF PRESENT ILLNESS
[N] : Patient denies prior pregnancies [TextBox_4] : 33yo  presents for routine gyn exam.  No complaints.  Pt. with Hx of PCOS and very long cycles 55-75 days going for IVF (in process).  \par Last visit 2021 for post partum C/S\par Info. from prior EMR:\par \par PMH\par  No significant past medical history. \par Accepts blood products \par Surgical History: No significant surgical history. \par Allergies: nkda \par  Personal history of blood clots/DVT/PE: no  \par  Personal history of conditions causing increased risk of blood clots/DVT/PE: no  \par  Family history of blood clots/DVT/PE: no  \par  Family history of conditions causing increased risk of blood clots/DVT/PE: no [PapSmeardate] : 8/2020 [TextBox_31] : wnl [MensesFreq] : 55 [PGxTotal] : 1

## 2022-01-25 NOTE — PHYSICAL EXAM
[Chaperone Present] : A chaperone was present in the examining room during all aspects of the physical examination [Appropriately responsive] : appropriately responsive [Alert] : alert [No Acute Distress] : no acute distress [No Lymphadenopathy] : no lymphadenopathy [Soft] : soft [Non-tender] : non-tender [Non-distended] : non-distended [No Lesions] : no lesions [No Mass] : no mass [Oriented x3] : oriented x3 [Examination Of The Breasts] : a normal appearance [No Masses] : no breast masses were palpable [Labia Majora] : normal [Labia Minora] : normal [Normal] : normal [Uterine Adnexae] : normal [FreeTextEntry1] : melissa [FreeTextEntry4] : Color pink, no distress, [FreeTextEntry5] : Resp. rate wnl, color pink, no SOB

## 2022-02-05 LAB
CYTOLOGY CVX/VAG DOC THIN PREP: NORMAL
HPV HIGH+LOW RISK DNA PNL CVX: NOT DETECTED

## 2022-03-22 NOTE — OB RN PATIENT PROFILE - COMFORT/ACCEPTABLE PAIN LEVEL (0-10)
89 yp F pmhx of h/o HTN, fibromyalgia, spinal stenosis on chronic pain medications, a-fib on Eliquis, CVA, hx x2 cardiac stents, chronic chest pain and sob who is presenting from [facility] doctors office for evaluation of tachycardia.  Pt currently describes CP as heaviness, and states she is having intermittent palpitations.      PLAN: CBC/CMP mg+phos/VBG/BNP/Trop/TSH/CXR 89 yp F pmhx of h/o HTN, fibromyalgia, spinal stenosis on chronic pain medications, a-fib on Eliquis, CVA, hx x2 cardiac stents, chronic chest pain and sob who is presenting from [facility] doctors office for evaluation of tachycardia.  Pt currently describes CP as heaviness, and states she is having intermittent palpitations.  Must consider ACS. Unlikely PE given story and already on blood thinners.   PLAN: CBC/CMP mg+phos/VBG/BNP/Trop/TSH/CXR 7 89 yp F pmhx of h/o HTN, fibromyalgia, spinal stenosis on chronic pain medications, a-fib on Eliquis, CVA, hx x2 cardiac stents, chronic chest pain and sob who is presenting from [facility] doctors office for evaluation of tachycardia.  Pt currently describes CP as heaviness, heaviness improved, and states she is having intermittent palpitations.   PLAN: CBC/CMP mg+phos/VBG/BNP/Trop/TSH/CXR

## 2022-11-23 ENCOUNTER — NON-APPOINTMENT (OUTPATIENT)
Age: 32
End: 2022-11-23

## 2022-11-23 ENCOUNTER — APPOINTMENT (OUTPATIENT)
Dept: OBGYN | Facility: CLINIC | Age: 32
End: 2022-11-23

## 2022-11-23 VITALS
BODY MASS INDEX: 21.14 KG/M2 | WEIGHT: 112 LBS | DIASTOLIC BLOOD PRESSURE: 66 MMHG | SYSTOLIC BLOOD PRESSURE: 100 MMHG | HEIGHT: 61 IN

## 2022-11-23 DIAGNOSIS — O09.811 SUPERVISION OF PREGNANCY RESULTING FROM ASSISTED REPRODUCTIVE TECHNOLOGY, FIRST TRIMESTER: ICD-10-CM

## 2022-11-23 PROCEDURE — 36415 COLL VENOUS BLD VENIPUNCTURE: CPT

## 2022-11-23 PROCEDURE — 0500F INITIAL PRENATAL CARE VISIT: CPT

## 2022-11-27 LAB
25(OH)D3 SERPL-MCNC: 30.2 NG/ML
ABO + RH PNL BLD: NORMAL
BACTERIA UR CULT: NORMAL
BASOPHILS # BLD AUTO: 0.02 K/UL
BASOPHILS NFR BLD AUTO: 0.4 %
BLD GP AB SCN SERPL QL: NORMAL
C TRACH RRNA SPEC QL NAA+PROBE: NOT DETECTED
EOSINOPHIL # BLD AUTO: 0.07 K/UL
EOSINOPHIL NFR BLD AUTO: 1.4 %
HBV SURFACE AG SER QL: NONREACTIVE
HCT VFR BLD CALC: 42.7 %
HCV AB SER QL: NONREACTIVE
HCV S/CO RATIO: 0.25 S/CO
HGB A MFR BLD: 95.8 %
HGB A2 MFR BLD: 2.8 %
HGB BLD-MCNC: 13.6 G/DL
HGB F MFR BLD: 1.4 %
HGB FRACT BLD-IMP: NORMAL
HIV1+2 AB SPEC QL IA.RAPID: NONREACTIVE
IMM GRANULOCYTES NFR BLD AUTO: 0.2 %
LEAD BLD-MCNC: <1 UG/DL
LYMPHOCYTES # BLD AUTO: 1.01 K/UL
LYMPHOCYTES NFR BLD AUTO: 20.8 %
MAN DIFF?: NORMAL
MCHC RBC-ENTMCNC: 31.1 PG
MCHC RBC-ENTMCNC: 31.9 GM/DL
MCV RBC AUTO: 97.5 FL
MEV IGG FLD QL IA: 74.4 AU/ML
MEV IGG+IGM SER-IMP: POSITIVE
MONOCYTES # BLD AUTO: 0.35 K/UL
MONOCYTES NFR BLD AUTO: 7.2 %
MUV AB SER-ACNC: POSITIVE
MUV IGG SER QL IA: 12.5 AU/ML
N GONORRHOEA RRNA SPEC QL NAA+PROBE: NOT DETECTED
NEUTROPHILS # BLD AUTO: 3.4 K/UL
NEUTROPHILS NFR BLD AUTO: 70 %
PLATELET # BLD AUTO: 335 K/UL
RBC # BLD: 4.38 M/UL
RBC # FLD: 13 %
RUBV IGG FLD-ACNC: 1.9 INDEX
RUBV IGG SER-IMP: POSITIVE
SOURCE AMPLIFICATION: NORMAL
T PALLIDUM AB SER QL IA: NEGATIVE
TSH SERPL-ACNC: 0.66 UIU/ML
VZV AB TITR SER: POSITIVE
VZV IGG SER IF-ACNC: 1403 INDEX
WBC # FLD AUTO: 4.86 K/UL

## 2022-11-28 ENCOUNTER — TRANSCRIPTION ENCOUNTER (OUTPATIENT)
Age: 32
End: 2022-11-28

## 2022-11-29 LAB
B19V IGG SER QL IA: 6.96 INDEX
B19V IGG+IGM SER-IMP: NORMAL
B19V IGG+IGM SER-IMP: POSITIVE
B19V IGM FLD-ACNC: 0.11 INDEX
B19V IGM SER-ACNC: NEGATIVE

## 2022-12-09 ENCOUNTER — NON-APPOINTMENT (OUTPATIENT)
Age: 32
End: 2022-12-09

## 2022-12-09 ENCOUNTER — APPOINTMENT (OUTPATIENT)
Dept: OBGYN | Facility: CLINIC | Age: 32
End: 2022-12-09

## 2022-12-09 ENCOUNTER — ASOB RESULT (OUTPATIENT)
Age: 32
End: 2022-12-09

## 2022-12-09 DIAGNOSIS — O34.219 MATERNAL CARE FOR UNSPECIFIED TYPE SCAR FROM PREVIOUS CESAREAN DELIVERY: ICD-10-CM

## 2022-12-09 PROCEDURE — 0502F SUBSEQUENT PRENATAL CARE: CPT

## 2022-12-09 PROCEDURE — 36415 COLL VENOUS BLD VENIPUNCTURE: CPT

## 2022-12-09 PROCEDURE — 76813 OB US NUCHAL MEAS 1 GEST: CPT

## 2022-12-09 PROCEDURE — 76801 OB US < 14 WKS SINGLE FETUS: CPT | Mod: 59

## 2022-12-10 LAB
ESTIMATED AVERAGE GLUCOSE: 97 MG/DL
HBA1C MFR BLD HPLC: 5 %

## 2022-12-13 ENCOUNTER — NON-APPOINTMENT (OUTPATIENT)
Age: 32
End: 2022-12-13

## 2022-12-16 ENCOUNTER — TRANSCRIPTION ENCOUNTER (OUTPATIENT)
Age: 32
End: 2022-12-16

## 2023-01-03 ENCOUNTER — APPOINTMENT (OUTPATIENT)
Dept: OBGYN | Facility: CLINIC | Age: 33
End: 2023-01-03
Payer: COMMERCIAL

## 2023-01-03 DIAGNOSIS — Z34.92 ENCOUNTER FOR SUPERVISION OF NORMAL PREGNANCY, UNSPECIFIED, SECOND TRIMESTER: ICD-10-CM

## 2023-01-03 PROCEDURE — 36415 COLL VENOUS BLD VENIPUNCTURE: CPT

## 2023-01-03 PROCEDURE — 0502F SUBSEQUENT PRENATAL CARE: CPT

## 2023-01-06 LAB
AFP MOM: 1.31
AFP VALUE: 53.1 NG/ML
ALPHA FETOPROTEIN SERUM COMMENT: NORMAL
ALPHA FETOPROTEIN SERUM INTERPRETATION: NORMAL
ALPHA FETOPROTEIN SERUM RESULTS: NORMAL
ALPHA FETOPROTEIN SERUM TEST RESULTS: NORMAL
GESTATIONAL AGE BASED ON: NORMAL
GESTATIONAL AGE ON COLLECTION DATE: 15.6 WEEKS
INSULIN DEP DIABETES: NO
MATERNAL AGE AT EDD AFP: 32.4 YR
MULTIPLE GESTATION: NO
OSBR RISK 1 IN: 4664
RACE: NORMAL
WEIGHT AFP: 112 LBS

## 2023-02-03 ENCOUNTER — OUTPATIENT (OUTPATIENT)
Dept: OUTPATIENT SERVICES | Age: 33
LOS: 1 days | Discharge: HOME | End: 2023-02-03

## 2023-02-06 ENCOUNTER — APPOINTMENT (OUTPATIENT)
Dept: PEDIATRIC CARDIOLOGY | Facility: CLINIC | Age: 33
End: 2023-02-06
Payer: COMMERCIAL

## 2023-02-06 PROCEDURE — 76827 ECHO EXAM OF FETAL HEART: CPT

## 2023-02-06 PROCEDURE — 93325 DOPPLER ECHO COLOR FLOW MAPG: CPT

## 2023-02-06 PROCEDURE — 76825 ECHO EXAM OF FETAL HEART: CPT

## 2023-02-07 ENCOUNTER — ASOB RESULT (OUTPATIENT)
Age: 33
End: 2023-02-07

## 2023-02-07 ENCOUNTER — APPOINTMENT (OUTPATIENT)
Dept: ANTEPARTUM | Facility: CLINIC | Age: 33
End: 2023-02-07
Payer: COMMERCIAL

## 2023-02-07 PROCEDURE — 76811 OB US DETAILED SNGL FETUS: CPT

## 2023-02-07 PROCEDURE — 99202 OFFICE O/P NEW SF 15 MIN: CPT | Mod: 25

## 2023-02-28 ENCOUNTER — APPOINTMENT (OUTPATIENT)
Dept: OBGYN | Facility: CLINIC | Age: 33
End: 2023-02-28
Payer: COMMERCIAL

## 2023-02-28 PROCEDURE — 0502F SUBSEQUENT PRENATAL CARE: CPT

## 2023-03-21 ENCOUNTER — APPOINTMENT (OUTPATIENT)
Dept: OBGYN | Facility: CLINIC | Age: 33
End: 2023-03-21
Payer: COMMERCIAL

## 2023-03-21 VITALS
DIASTOLIC BLOOD PRESSURE: 62 MMHG | WEIGHT: 125 LBS | SYSTOLIC BLOOD PRESSURE: 102 MMHG | BODY MASS INDEX: 23.6 KG/M2 | HEIGHT: 61 IN

## 2023-03-21 DIAGNOSIS — O09.812 SUPERVISION OF PREGNANCY RESULTING FROM ASSISTED REPRODUCTIVE TECHNOLOGY, SECOND TRIMESTER: ICD-10-CM

## 2023-03-21 PROCEDURE — 36415 COLL VENOUS BLD VENIPUNCTURE: CPT

## 2023-03-21 PROCEDURE — 0502F SUBSEQUENT PRENATAL CARE: CPT

## 2023-03-22 LAB
25(OH)D3 SERPL-MCNC: 26.6 NG/ML
BASOPHILS # BLD AUTO: 0.02 K/UL
BASOPHILS NFR BLD AUTO: 0.4 %
BLD GP AB SCN SERPL QL: NORMAL
EOSINOPHIL # BLD AUTO: 0.03 K/UL
EOSINOPHIL NFR BLD AUTO: 0.6 %
GLUCOSE 1H P 50 G GLC PO SERPL-MCNC: 123 MG/DL
HCT VFR BLD CALC: 37.5 %
HCV AB SER QL: NONREACTIVE
HCV S/CO RATIO: 0.17 S/CO
HGB BLD-MCNC: 12.3 G/DL
HIV1+2 AB SPEC QL IA.RAPID: NONREACTIVE
IMM GRANULOCYTES NFR BLD AUTO: 0.4 %
LYMPHOCYTES # BLD AUTO: 0.75 K/UL
LYMPHOCYTES NFR BLD AUTO: 15.4 %
MAN DIFF?: NORMAL
MCHC RBC-ENTMCNC: 31.9 PG
MCHC RBC-ENTMCNC: 32.8 GM/DL
MCV RBC AUTO: 97.2 FL
MONOCYTES # BLD AUTO: 0.27 K/UL
MONOCYTES NFR BLD AUTO: 5.5 %
NEUTROPHILS # BLD AUTO: 3.78 K/UL
NEUTROPHILS NFR BLD AUTO: 77.7 %
PLATELET # BLD AUTO: 265 K/UL
RBC # BLD: 3.86 M/UL
RBC # FLD: 13.5 %
T PALLIDUM AB SER QL IA: NEGATIVE
WBC # FLD AUTO: 4.87 K/UL

## 2023-03-24 ENCOUNTER — APPOINTMENT (OUTPATIENT)
Dept: ANTEPARTUM | Facility: CLINIC | Age: 33
End: 2023-03-24
Payer: COMMERCIAL

## 2023-03-24 ENCOUNTER — ASOB RESULT (OUTPATIENT)
Age: 33
End: 2023-03-24

## 2023-03-24 ENCOUNTER — NON-APPOINTMENT (OUTPATIENT)
Age: 33
End: 2023-03-24

## 2023-03-24 PROCEDURE — 76816 OB US FOLLOW-UP PER FETUS: CPT

## 2023-04-03 NOTE — PROGRESS NOTE ADULT - PROBLEM/PLAN-1
DISPLAY PLAN FREE TEXT Consent (Scalp)/Introductory Paragraph: The rationale for Mohs was explained to the patient and consent was obtained. The risks, benefits and alternatives to therapy were discussed in detail. Specifically, the risks of changes in hair growth pattern secondary to repair, infection, scarring, bleeding, prolonged wound healing, incomplete removal, allergy to anesthesia, nerve injury and recurrence were addressed. Prior to the procedure, the treatment site was clearly identified and confirmed by the patient. All components of Universal Protocol/PAUSE Rule completed.

## 2023-04-18 ENCOUNTER — APPOINTMENT (OUTPATIENT)
Dept: OBGYN | Facility: CLINIC | Age: 33
End: 2023-04-18
Payer: COMMERCIAL

## 2023-04-18 ENCOUNTER — ASOB RESULT (OUTPATIENT)
Age: 33
End: 2023-04-18

## 2023-04-18 VITALS
HEIGHT: 61 IN | DIASTOLIC BLOOD PRESSURE: 72 MMHG | WEIGHT: 132 LBS | HEART RATE: 101 BPM | SYSTOLIC BLOOD PRESSURE: 104 MMHG | BODY MASS INDEX: 24.92 KG/M2

## 2023-04-18 DIAGNOSIS — O34.219 MATERNAL CARE FOR UNSPECIFIED TYPE SCAR FROM PREVIOUS CESAREAN DELIVERY: ICD-10-CM

## 2023-04-18 PROCEDURE — 0502F SUBSEQUENT PRENATAL CARE: CPT

## 2023-04-18 PROCEDURE — 76816 OB US FOLLOW-UP PER FETUS: CPT

## 2023-05-02 ENCOUNTER — NON-APPOINTMENT (OUTPATIENT)
Age: 33
End: 2023-05-02

## 2023-05-02 ENCOUNTER — APPOINTMENT (OUTPATIENT)
Dept: OBGYN | Facility: CLINIC | Age: 33
End: 2023-05-02
Payer: COMMERCIAL

## 2023-05-02 DIAGNOSIS — Z23 ENCOUNTER FOR IMMUNIZATION: ICD-10-CM

## 2023-05-02 PROCEDURE — 0502F SUBSEQUENT PRENATAL CARE: CPT

## 2023-05-02 PROCEDURE — 90471 IMMUNIZATION ADMIN: CPT

## 2023-05-02 PROCEDURE — 90715 TDAP VACCINE 7 YRS/> IM: CPT

## 2023-05-16 ENCOUNTER — APPOINTMENT (OUTPATIENT)
Dept: OBGYN | Facility: CLINIC | Age: 33
End: 2023-05-16
Payer: COMMERCIAL

## 2023-05-16 ENCOUNTER — ASOB RESULT (OUTPATIENT)
Age: 33
End: 2023-05-16

## 2023-05-16 VITALS
DIASTOLIC BLOOD PRESSURE: 68 MMHG | SYSTOLIC BLOOD PRESSURE: 108 MMHG | HEIGHT: 61 IN | WEIGHT: 131 LBS | BODY MASS INDEX: 24.73 KG/M2

## 2023-05-16 PROCEDURE — 76819 FETAL BIOPHYS PROFIL W/O NST: CPT | Mod: 59

## 2023-05-16 PROCEDURE — 0502F SUBSEQUENT PRENATAL CARE: CPT

## 2023-05-16 PROCEDURE — 76816 OB US FOLLOW-UP PER FETUS: CPT

## 2023-05-31 ENCOUNTER — NON-APPOINTMENT (OUTPATIENT)
Age: 33
End: 2023-05-31

## 2023-05-31 ENCOUNTER — APPOINTMENT (OUTPATIENT)
Dept: OBGYN | Facility: CLINIC | Age: 33
End: 2023-05-31
Payer: COMMERCIAL

## 2023-05-31 ENCOUNTER — APPOINTMENT (OUTPATIENT)
Dept: OBGYN | Facility: CLINIC | Age: 33
End: 2023-05-31

## 2023-05-31 DIAGNOSIS — O09.813 SUPERVISION OF PREGNANCY RESULTING FROM ASSISTED REPRODUCTIVE TECHNOLOGY, THIRD TRIMESTER: ICD-10-CM

## 2023-05-31 PROCEDURE — 0502F SUBSEQUENT PRENATAL CARE: CPT

## 2023-06-02 ENCOUNTER — OUTPATIENT (OUTPATIENT)
Dept: OUTPATIENT SERVICES | Facility: HOSPITAL | Age: 33
LOS: 1 days | End: 2023-06-02
Payer: COMMERCIAL

## 2023-06-02 VITALS
TEMPERATURE: 98 F | DIASTOLIC BLOOD PRESSURE: 82 MMHG | WEIGHT: 132.94 LBS | SYSTOLIC BLOOD PRESSURE: 119 MMHG | RESPIRATION RATE: 18 BRPM | HEART RATE: 78 BPM | OXYGEN SATURATION: 97 % | HEIGHT: 61 IN

## 2023-06-02 DIAGNOSIS — Z98.891 HISTORY OF UTERINE SCAR FROM PREVIOUS SURGERY: Chronic | ICD-10-CM

## 2023-06-02 DIAGNOSIS — Z01.818 ENCOUNTER FOR OTHER PREPROCEDURAL EXAMINATION: ICD-10-CM

## 2023-06-02 DIAGNOSIS — Z29.9 ENCOUNTER FOR PROPHYLACTIC MEASURES, UNSPECIFIED: ICD-10-CM

## 2023-06-02 DIAGNOSIS — O09.812 SUPERVISION OF PREGNANCY RESULTING FROM ASSISTED REPRODUCTIVE TECHNOLOGY, SECOND TRIMESTER: ICD-10-CM

## 2023-06-02 DIAGNOSIS — O34.219 MATERNAL CARE FOR UNSPECIFIED TYPE SCAR FROM PREVIOUS CESAREAN DELIVERY: ICD-10-CM

## 2023-06-02 LAB
BLD GP AB SCN SERPL QL: NEGATIVE — SIGNIFICANT CHANGE UP
HCT VFR BLD CALC: 37.3 % — SIGNIFICANT CHANGE UP (ref 34.5–45)
HGB BLD-MCNC: 12.8 G/DL — SIGNIFICANT CHANGE UP (ref 11.5–15.5)
MCHC RBC-ENTMCNC: 31.9 PG — SIGNIFICANT CHANGE UP (ref 27–34)
MCHC RBC-ENTMCNC: 34.3 GM/DL — SIGNIFICANT CHANGE UP (ref 32–36)
MCV RBC AUTO: 93 FL — SIGNIFICANT CHANGE UP (ref 80–100)
NRBC # BLD: 0 /100 WBCS — SIGNIFICANT CHANGE UP (ref 0–0)
PLATELET # BLD AUTO: 248 K/UL — SIGNIFICANT CHANGE UP (ref 150–400)
RBC # BLD: 4.01 M/UL — SIGNIFICANT CHANGE UP (ref 3.8–5.2)
RBC # FLD: 13 % — SIGNIFICANT CHANGE UP (ref 10.3–14.5)
RH IG SCN BLD-IMP: POSITIVE — SIGNIFICANT CHANGE UP
WBC # BLD: 5.89 K/UL — SIGNIFICANT CHANGE UP (ref 3.8–10.5)
WBC # FLD AUTO: 5.89 K/UL — SIGNIFICANT CHANGE UP (ref 3.8–10.5)

## 2023-06-02 PROCEDURE — G0463: CPT

## 2023-06-02 PROCEDURE — 85027 COMPLETE CBC AUTOMATED: CPT

## 2023-06-02 PROCEDURE — 86850 RBC ANTIBODY SCREEN: CPT

## 2023-06-02 PROCEDURE — 36415 COLL VENOUS BLD VENIPUNCTURE: CPT

## 2023-06-02 PROCEDURE — 86901 BLOOD TYPING SEROLOGIC RH(D): CPT

## 2023-06-02 PROCEDURE — 86900 BLOOD TYPING SEROLOGIC ABO: CPT

## 2023-06-02 RX ORDER — OXYTOCIN 10 UNIT/ML
333.33 VIAL (ML) INJECTION
Qty: 20 | Refills: 0 | Status: COMPLETED | OUTPATIENT
Start: 2023-06-19 | End: 2023-06-19

## 2023-06-02 RX ORDER — SODIUM CHLORIDE 9 MG/ML
1000 INJECTION, SOLUTION INTRAVENOUS
Refills: 0 | Status: DISCONTINUED | OUTPATIENT
Start: 2023-06-19 | End: 2023-06-19

## 2023-06-02 NOTE — OB PST NOTE - ASSESSMENT
CAPRINI SCORE [CLOT updated 18]    AGE RELATED RISK FACTORS                                                       MOBILITY RELATED FACTORS  [ ] Age 41-60 years                                            (1 Point)                    [ ] Bed rest                                                        (1 Point)  [ ] Age: 61-74 years                                           (2 Points)                  [ ] Plaster cast                                                   (2 Points)  [ ] Age= 75 years                                              (3 Points)                    [ ] Bed bound for more than 72 hours                 (2 Points)    DISEASE RELATED RISK FACTORS                                               GENDER SPECIFIC FACTORS  [ ] Edema in the lower extremities                       (1 Point)              [x ] Pregnancy                                                     (1 Point)  [ ] Varicose veins                                               (1 Point)                     [ ] Post-partum < 6 weeks                                   (1 Point)             [ ] BMI > 25 Kg/m2                                            (1 Point)                     [ ] Hormonal therapy  or oral contraception          (1 Point)                 [ ] Sepsis (in the previous month)                        (1 Point)               [ ] History of pregnancy complications                 (1 point)  [ ] Pneumonia or serious lung disease                                               [ ] Unexplained or recurrent                     (1 Point)           (in the previous month)                               (1 Point)  [ ] Abnormal pulmonary function test                     (1 Point)                 SURGERY RELATED RISK FACTORS  [ ] Acute myocardial infarction                              (1 Point)               [x ]  Section                                             (1 Point)  [ ] Congestive heart failure (in the previous month)  (1 Point)      [ ] Minor surgery                                                  (1 Point)   [ ] Inflammatory bowel disease                             (1 Point)               [ ] Arthroscopic surgery                                        (2 Points)  [ ] Central venous access                                      (2 Points)                [ ] General surgery lasting more than 45 minutes (2 points)  [ ] Present or previous malignancy                     (2 Points)                [ ] Elective arthroplasty                                         (5 points)    [ ] Stroke (in the previous month)                          (5 Points)                                                                                                                                                           HEMATOLOGY RELATED FACTORS                                                 TRAUMA RELATED RISK FACTORS  [ ] Prior episodes of VTE                                     (3 Points)                [ ] Fracture of the hip, pelvis, or leg                       (5 Points)  [ ] Positive family history for VTE                         (3 Points)             [ ] Acute spinal cord injury (in the previous month)  (5 Points)  [ ] Prothrombin 41866 A                                     (3 Points)               [ ] Paralysis  (less than 1 month)                             (5 Points)  [ ] Factor V Leiden                                             (3 Points)                  [ ] Multiple Trauma within 1 month                        (5 Points)  [ ] Lupus anticoagulants                                     (3 Points)                                                           [ ] Anticardiolipin antibodies                               (3 Points)                                                       [ ] High homocysteine in the blood                      (3 Points)                                             [ ] Other congenital or acquired thrombophilia      (3 Points)                                                [ ] Heparin induced thrombocytopenia                  (3 Points)                                     Total Score [   2       ] CAPRINI SCORE [CLOT updated 18]    AGE RELATED RISK FACTORS                                                       MOBILITY RELATED FACTORS  [ ] Age 41-60 years                                            (1 Point)                    [ ] Bed rest                                                        (1 Point)  [ ] Age: 61-74 years                                           (2 Points)                  [ ] Plaster cast                                                   (2 Points)  [ ] Age= 75 years                                              (3 Points)                    [ ] Bed bound for more than 72 hours                 (2 Points)    DISEASE RELATED RISK FACTORS                                               GENDER SPECIFIC FACTORS  [ ] Edema in the lower extremities                       (1 Point)              [x ] Pregnancy                                                     (1 Point)  [ ] Varicose veins                                               (1 Point)                     [ ] Post-partum < 6 weeks                                   (1 Point)             [ x] BMI > 25 Kg/m2                                            (1 Point)                     [ ] Hormonal therapy  or oral contraception          (1 Point)                 [ ] Sepsis (in the previous month)                        (1 Point)               [ ] History of pregnancy complications                 (1 point)  [ ] Pneumonia or serious lung disease                                               [ ] Unexplained or recurrent                     (1 Point)           (in the previous month)                               (1 Point)  [ ] Abnormal pulmonary function test                     (1 Point)                 SURGERY RELATED RISK FACTORS  [ ] Acute myocardial infarction                              (1 Point)               [x ]  Section                                             (1 Point)  [ ] Congestive heart failure (in the previous month)  (1 Point)      [ ] Minor surgery                                                  (1 Point)   [ ] Inflammatory bowel disease                             (1 Point)               [ ] Arthroscopic surgery                                        (2 Points)  [ ] Central venous access                                      (2 Points)                [ ] General surgery lasting more than 45 minutes (2 points)  [ ] Present or previous malignancy                     (2 Points)                [ ] Elective arthroplasty                                         (5 points)    [ ] Stroke (in the previous month)                          (5 Points)                                                                                                                                                           HEMATOLOGY RELATED FACTORS                                                 TRAUMA RELATED RISK FACTORS  [ ] Prior episodes of VTE                                     (3 Points)                [ ] Fracture of the hip, pelvis, or leg                       (5 Points)  [ ] Positive family history for VTE                         (3 Points)             [ ] Acute spinal cord injury (in the previous month)  (5 Points)  [ ] Prothrombin 55672 A                                     (3 Points)               [ ] Paralysis  (less than 1 month)                             (5 Points)  [ ] Factor V Leiden                                             (3 Points)                  [ ] Multiple Trauma within 1 month                        (5 Points)  [ ] Lupus anticoagulants                                     (3 Points)                                                           [ ] Anticardiolipin antibodies                               (3 Points)                                                       [ ] High homocysteine in the blood                      (3 Points)                                             [ ] Other congenital or acquired thrombophilia      (3 Points)                                                [ ] Heparin induced thrombocytopenia                  (3 Points)                                     Total Score [   2       ]

## 2023-06-02 NOTE — OB PST NOTE - TEMPERATURE IN FAHRENHEIT (DEGREES F)
From: Jean-Luc Vanderheyden  To: Cameron Moe MD  Sent: 9/7/2018 2:36 PM CDT  Subject: 7 SEP 2018, Update    Dear Dr. Moe,   I will say that the situation with my sweating at night has been much better, but as Fall is starting, it seems I am more frequently acrid sweating again at night. We have bought a new bed that will be delivered in a few weeks which advertise that it will be able to keep track of the sleep quality (SleepIQ) using sensors in the bed. I just wanted to let you know in case you think we need to check further.   This year, I have not pushed myself as much physically. First, I damaged my left ankle mid-April (I saw Dr. Galarza in your office to verify that it was not broken), but it did take quite some time to recover fully, so, I only started my duathlon season at the beginning of July. I did also do a sprint triathlon (a first for me), which is basically only about 1h30 duration. I have been training for the [employer]lon in 2 weeks, and I have signed up for my only long distance running, a half marathon, 2 November. I also train with biking, but usually at reasonable pace and with a group (17 mph, about 30 miles).   I have gained a little weight that I have a tough time getting rid off (from my typical 173-174 lbs, I am now at 181 lbs). I could go into a diet, but I typically measure my calories in (and my exercise), that I am about equal, and I continue to eat healthy (fruits, good fat,...).   I don't remember when we need to do another thyroid check. I just had a few samples tested using [employer]:    • Testosterone: 363   • Cortisol: 14.3   • Cortisone: 2.0   • Progesterone: < 0.4   • Testosterone / Cortisol Ratio: 25   • Cortisol / Cortisone Ratio: 7.2  Let me know if you see anything that I need to do. I will want like usual the flu shot. I know I have had it at [facility] or perhaps at your place too. I don't always keep track.   Sincerely,  Arash   98.1

## 2023-06-02 NOTE — OB PST NOTE - NSANTHOSAYNRD_GEN_A_CORE
No. EROS screening performed.  STOP BANG Legend: 0-2 = LOW Risk; 3-4 = INTERMEDIATE Risk; 5-8 = HIGH Risk

## 2023-06-03 ENCOUNTER — NON-APPOINTMENT (OUTPATIENT)
Age: 33
End: 2023-06-03

## 2023-06-06 ENCOUNTER — APPOINTMENT (OUTPATIENT)
Dept: OBGYN | Facility: CLINIC | Age: 33
End: 2023-06-06
Payer: COMMERCIAL

## 2023-06-06 PROCEDURE — 0502F SUBSEQUENT PRENATAL CARE: CPT

## 2023-06-13 ENCOUNTER — ASOB RESULT (OUTPATIENT)
Age: 33
End: 2023-06-13

## 2023-06-13 ENCOUNTER — APPOINTMENT (OUTPATIENT)
Dept: OBGYN | Facility: CLINIC | Age: 33
End: 2023-06-13
Payer: COMMERCIAL

## 2023-06-13 PROCEDURE — 76816 OB US FOLLOW-UP PER FETUS: CPT

## 2023-06-13 PROCEDURE — 0502F SUBSEQUENT PRENATAL CARE: CPT

## 2023-06-13 PROCEDURE — 76819 FETAL BIOPHYS PROFIL W/O NST: CPT | Mod: 59

## 2023-06-14 PROBLEM — R12 HEARTBURN: Chronic | Status: ACTIVE | Noted: 2023-06-02

## 2023-06-18 ENCOUNTER — TRANSCRIPTION ENCOUNTER (OUTPATIENT)
Age: 33
End: 2023-06-18

## 2023-06-19 ENCOUNTER — APPOINTMENT (OUTPATIENT)
Dept: OBGYN | Facility: HOSPITAL | Age: 33
End: 2023-06-19

## 2023-06-19 ENCOUNTER — INPATIENT (INPATIENT)
Facility: HOSPITAL | Age: 33
LOS: 1 days | Discharge: HOME | End: 2023-06-21
Attending: OBSTETRICS & GYNECOLOGY | Admitting: OBSTETRICS & GYNECOLOGY
Payer: COMMERCIAL

## 2023-06-19 ENCOUNTER — NON-APPOINTMENT (OUTPATIENT)
Age: 33
End: 2023-06-19

## 2023-06-19 VITALS — HEART RATE: 66 BPM | SYSTOLIC BLOOD PRESSURE: 89 MMHG | DIASTOLIC BLOOD PRESSURE: 52 MMHG

## 2023-06-19 DIAGNOSIS — O09.812 SUPERVISION OF PREGNANCY RESULTING FROM ASSISTED REPRODUCTIVE TECHNOLOGY, SECOND TRIMESTER: ICD-10-CM

## 2023-06-19 DIAGNOSIS — Z98.891 HISTORY OF UTERINE SCAR FROM PREVIOUS SURGERY: Chronic | ICD-10-CM

## 2023-06-19 LAB
BLD GP AB SCN SERPL QL: NEGATIVE — SIGNIFICANT CHANGE UP
COVID-19 SPIKE DOMAIN AB INTERP: POSITIVE
COVID-19 SPIKE DOMAIN ANTIBODY RESULT: >250 U/ML — HIGH
RH IG SCN BLD-IMP: POSITIVE — SIGNIFICANT CHANGE UP
SARS-COV-2 IGG+IGM SERPL QL IA: >250 U/ML — HIGH
SARS-COV-2 IGG+IGM SERPL QL IA: POSITIVE

## 2023-06-19 PROCEDURE — 59510 CESAREAN DELIVERY: CPT

## 2023-06-19 RX ORDER — TETANUS TOXOID, REDUCED DIPHTHERIA TOXOID AND ACELLULAR PERTUSSIS VACCINE, ADSORBED 5; 2.5; 8; 8; 2.5 [IU]/.5ML; [IU]/.5ML; UG/.5ML; UG/.5ML; UG/.5ML
0.5 SUSPENSION INTRAMUSCULAR ONCE
Refills: 0 | Status: DISCONTINUED | OUTPATIENT
Start: 2023-06-19 | End: 2023-06-21

## 2023-06-19 RX ORDER — SIMETHICONE 80 MG/1
80 TABLET, CHEWABLE ORAL EVERY 4 HOURS
Refills: 0 | Status: DISCONTINUED | OUTPATIENT
Start: 2023-06-19 | End: 2023-06-19

## 2023-06-19 RX ORDER — MAGNESIUM HYDROXIDE 400 MG/1
30 TABLET, CHEWABLE ORAL
Refills: 0 | Status: DISCONTINUED | OUTPATIENT
Start: 2023-06-19 | End: 2023-06-19

## 2023-06-19 RX ORDER — IBUPROFEN 200 MG
600 TABLET ORAL EVERY 6 HOURS
Refills: 0 | Status: COMPLETED | OUTPATIENT
Start: 2023-06-19 | End: 2024-05-17

## 2023-06-19 RX ORDER — IBUPROFEN 200 MG
600 TABLET ORAL EVERY 6 HOURS
Refills: 0 | Status: DISCONTINUED | OUTPATIENT
Start: 2023-06-19 | End: 2023-06-21

## 2023-06-19 RX ORDER — SODIUM CHLORIDE 9 MG/ML
1000 INJECTION, SOLUTION INTRAVENOUS
Refills: 0 | Status: DISCONTINUED | OUTPATIENT
Start: 2023-06-19 | End: 2023-06-19

## 2023-06-19 RX ORDER — DIPHENHYDRAMINE HCL 50 MG
25 CAPSULE ORAL EVERY 6 HOURS
Refills: 0 | Status: DISCONTINUED | OUTPATIENT
Start: 2023-06-19 | End: 2023-06-21

## 2023-06-19 RX ORDER — OXYTOCIN 10 UNIT/ML
333.33 VIAL (ML) INJECTION
Qty: 20 | Refills: 0 | Status: DISCONTINUED | OUTPATIENT
Start: 2023-06-19 | End: 2023-06-21

## 2023-06-19 RX ORDER — KETOROLAC TROMETHAMINE 30 MG/ML
30 SYRINGE (ML) INJECTION EVERY 6 HOURS
Refills: 0 | Status: DISCONTINUED | OUTPATIENT
Start: 2023-06-19 | End: 2023-06-20

## 2023-06-19 RX ORDER — KETOROLAC TROMETHAMINE 30 MG/ML
30 SYRINGE (ML) INJECTION EVERY 6 HOURS
Refills: 0 | Status: DISCONTINUED | OUTPATIENT
Start: 2023-06-19 | End: 2023-06-19

## 2023-06-19 RX ORDER — SIMETHICONE 80 MG/1
80 TABLET, CHEWABLE ORAL EVERY 4 HOURS
Refills: 0 | Status: DISCONTINUED | OUTPATIENT
Start: 2023-06-19 | End: 2023-06-21

## 2023-06-19 RX ORDER — FAMOTIDINE 10 MG/ML
20 INJECTION INTRAVENOUS ONCE
Refills: 0 | Status: COMPLETED | OUTPATIENT
Start: 2023-06-19 | End: 2023-06-19

## 2023-06-19 RX ORDER — CEFAZOLIN SODIUM 1 G
2000 VIAL (EA) INJECTION ONCE
Refills: 0 | Status: COMPLETED | OUTPATIENT
Start: 2023-06-19 | End: 2023-06-19

## 2023-06-19 RX ORDER — ACETAMINOPHEN 500 MG
975 TABLET ORAL
Refills: 0 | Status: DISCONTINUED | OUTPATIENT
Start: 2023-06-19 | End: 2023-06-21

## 2023-06-19 RX ORDER — MORPHINE SULFATE 50 MG/1
0.1 CAPSULE, EXTENDED RELEASE ORAL ONCE
Refills: 0 | Status: DISCONTINUED | OUTPATIENT
Start: 2023-06-19 | End: 2023-06-20

## 2023-06-19 RX ORDER — OXYTOCIN 10 UNIT/ML
333.33 VIAL (ML) INJECTION
Qty: 20 | Refills: 0 | Status: DISCONTINUED | OUTPATIENT
Start: 2023-06-19 | End: 2023-06-19

## 2023-06-19 RX ORDER — CHLORHEXIDINE GLUCONATE 213 G/1000ML
1 SOLUTION TOPICAL ONCE
Refills: 0 | Status: COMPLETED | OUTPATIENT
Start: 2023-06-19 | End: 2023-06-19

## 2023-06-19 RX ORDER — DEXAMETHASONE 0.5 MG/5ML
4 ELIXIR ORAL EVERY 6 HOURS
Refills: 0 | Status: DISCONTINUED | OUTPATIENT
Start: 2023-06-19 | End: 2023-06-20

## 2023-06-19 RX ORDER — MAGNESIUM HYDROXIDE 400 MG/1
30 TABLET, CHEWABLE ORAL
Refills: 0 | Status: DISCONTINUED | OUTPATIENT
Start: 2023-06-19 | End: 2023-06-21

## 2023-06-19 RX ORDER — HEPARIN SODIUM 5000 [USP'U]/ML
5000 INJECTION INTRAVENOUS; SUBCUTANEOUS EVERY 12 HOURS
Refills: 0 | Status: DISCONTINUED | OUTPATIENT
Start: 2023-06-19 | End: 2023-06-21

## 2023-06-19 RX ORDER — OXYCODONE HYDROCHLORIDE 5 MG/1
5 TABLET ORAL
Refills: 0 | Status: DISCONTINUED | OUTPATIENT
Start: 2023-06-19 | End: 2023-06-21

## 2023-06-19 RX ORDER — LANOLIN
1 OINTMENT (GRAM) TOPICAL EVERY 6 HOURS
Refills: 0 | Status: DISCONTINUED | OUTPATIENT
Start: 2023-06-19 | End: 2023-06-21

## 2023-06-19 RX ORDER — SODIUM CHLORIDE 9 MG/ML
1000 INJECTION, SOLUTION INTRAVENOUS
Refills: 0 | Status: DISCONTINUED | OUTPATIENT
Start: 2023-06-19 | End: 2023-06-21

## 2023-06-19 RX ORDER — ACETAMINOPHEN 500 MG
975 TABLET ORAL
Refills: 0 | Status: DISCONTINUED | OUTPATIENT
Start: 2023-06-19 | End: 2023-06-19

## 2023-06-19 RX ORDER — NALOXONE HYDROCHLORIDE 4 MG/.1ML
0.1 SPRAY NASAL
Refills: 0 | Status: DISCONTINUED | OUTPATIENT
Start: 2023-06-19 | End: 2023-06-20

## 2023-06-19 RX ORDER — LANOLIN
1 OINTMENT (GRAM) TOPICAL EVERY 6 HOURS
Refills: 0 | Status: DISCONTINUED | OUTPATIENT
Start: 2023-06-19 | End: 2023-06-19

## 2023-06-19 RX ORDER — HEPARIN SODIUM 5000 [USP'U]/ML
5000 INJECTION INTRAVENOUS; SUBCUTANEOUS EVERY 12 HOURS
Refills: 0 | Status: DISCONTINUED | OUTPATIENT
Start: 2023-06-19 | End: 2023-06-19

## 2023-06-19 RX ORDER — OXYCODONE HYDROCHLORIDE 5 MG/1
5 TABLET ORAL
Refills: 0 | Status: COMPLETED | OUTPATIENT
Start: 2023-06-19 | End: 2023-06-26

## 2023-06-19 RX ORDER — OXYCODONE HYDROCHLORIDE 5 MG/1
5 TABLET ORAL
Refills: 0 | Status: DISCONTINUED | OUTPATIENT
Start: 2023-06-19 | End: 2023-06-20

## 2023-06-19 RX ORDER — ACETAMINOPHEN 500 MG
1000 TABLET ORAL ONCE
Refills: 0 | Status: COMPLETED | OUTPATIENT
Start: 2023-06-19 | End: 2023-06-19

## 2023-06-19 RX ORDER — SODIUM CHLORIDE 9 MG/ML
1000 INJECTION, SOLUTION INTRAVENOUS ONCE
Refills: 0 | Status: COMPLETED | OUTPATIENT
Start: 2023-06-19 | End: 2023-06-19

## 2023-06-19 RX ORDER — CITRIC ACID/SODIUM CITRATE 300-500 MG
15 SOLUTION, ORAL ORAL ONCE
Refills: 0 | Status: COMPLETED | OUTPATIENT
Start: 2023-06-19 | End: 2023-06-19

## 2023-06-19 RX ORDER — OXYCODONE HYDROCHLORIDE 5 MG/1
5 TABLET ORAL ONCE
Refills: 0 | Status: DISCONTINUED | OUTPATIENT
Start: 2023-06-19 | End: 2023-06-21

## 2023-06-19 RX ORDER — NALBUPHINE HYDROCHLORIDE 10 MG/ML
2.5 INJECTION, SOLUTION INTRAMUSCULAR; INTRAVENOUS; SUBCUTANEOUS EVERY 6 HOURS
Refills: 0 | Status: DISCONTINUED | OUTPATIENT
Start: 2023-06-19 | End: 2023-06-20

## 2023-06-19 RX ORDER — ONDANSETRON 8 MG/1
4 TABLET, FILM COATED ORAL EVERY 6 HOURS
Refills: 0 | Status: DISCONTINUED | OUTPATIENT
Start: 2023-06-19 | End: 2023-06-20

## 2023-06-19 RX ADMIN — Medication 400 MILLIGRAM(S): at 15:06

## 2023-06-19 RX ADMIN — Medication 30 MILLIGRAM(S): at 21:10

## 2023-06-19 RX ADMIN — HEPARIN SODIUM 5000 UNIT(S): 5000 INJECTION INTRAVENOUS; SUBCUTANEOUS at 18:20

## 2023-06-19 RX ADMIN — SODIUM CHLORIDE 2000 MILLILITER(S): 9 INJECTION, SOLUTION INTRAVENOUS at 10:35

## 2023-06-19 RX ADMIN — Medication 1000 MILLIUNIT(S)/MIN: at 14:36

## 2023-06-19 RX ADMIN — Medication 15 MILLILITER(S): at 10:35

## 2023-06-19 RX ADMIN — FAMOTIDINE 20 MILLIGRAM(S): 10 INJECTION INTRAVENOUS at 10:35

## 2023-06-19 RX ADMIN — Medication 30 MILLIGRAM(S): at 20:39

## 2023-06-19 RX ADMIN — Medication 975 MILLIGRAM(S): at 23:46

## 2023-06-19 RX ADMIN — CHLORHEXIDINE GLUCONATE 1 APPLICATION(S): 213 SOLUTION TOPICAL at 10:35

## 2023-06-19 RX ADMIN — Medication 30 MILLIGRAM(S): at 13:32

## 2023-06-19 NOTE — OB PROVIDER DELIVERY SUMMARY - NSPROVIDERDELIVERYNOTE_OBGYN_ALL_OB_FT
repeat LTCS, uncomplicated  viable female infant, vertex presentation, weight 3315g, cord gasses sent  Atony noted initially with improvement after administration of IV Pitocin    EBL: 561   IVF: 2400  UOP: 500    Dictation #_______    Dr. Ayala present and scrubbed throughout  [facility] PGY1 repeat LTCS, uncomplicated  viable female infant, vertex presentation, weight 3315g, cord gasses sent  Atony noted initially with improvement after administration of IV Pitocin    EBL: 561   IVF: 2400  UOP: 500    Dictation #[id number]    Dr. Ayala present and scrubbed throughout  [facility] PGY1 repeat LTCD, uncomplicated  viable female infant, vertex presentation, weight 3315g, cord gasses sent  short cord appreciated  Atony noted initially with improvement after administration of IV Pitocin  bleeding at right angle, repaired with hysterotomy     EBL: 561   IVF: 2400  UOP: 500    Dictation #[id number]    Dr. Ayala present and scrubbed throughout  [facility] PGY1

## 2023-06-19 NOTE — OB PROVIDER DELIVERY SUMMARY - NSSELHIDDEN_OBGYN_ALL_OB_FT
[NS_DeliveryAttending1_OBGYN_ALL_OB_FT:[id number]],[NS_DeliveryAssist1_OBGYN_ALL_OB_FT:[id number]],[NS_DeliveryRN_OBGYN_ALL_OB_FT:[id number]]

## 2023-06-19 NOTE — OB PROVIDER H&P - ASSESSMENT
A/P:  Pt is a 34 yo  @39w3d  presenting for scheduled rLTCS.   - uncomplicated pregnancy, GBS negative     1. Admit to LND. Routine Labs. IVF  2. for rLTCS  3. Fetus: Cat I tracing  4. GBS negative   5. Pain: IV pain meds/epidural PRN    Otilia Rob, PGY3

## 2023-06-19 NOTE — OB RN INTRAOPERATIVE NOTE - NSSELHIDDEN_OBGYN_ALL_OB_FT
[NS_DeliveryAttending1_OBGYN_ALL_OB_FT:[id number]],[NS_DeliveryAssist1_OBGYN_ALL_OB_FT:[id number]=],[NS_DeliveryRN_OBGYN_ALL_OB_FT:[id number]]

## 2023-06-19 NOTE — OB RN DELIVERY SUMMARY - NS_SEPSISRSKCALC_OBGYN_ALL_OB_FT
EOS calculated successfully. EOS Risk Factor: 0.5/1000 live births ([facility] national incidence); GA=39w3d; Temp=98.1; ROM=0; GBS='Negative'; Antibiotics='No antibiotics or any antibiotics < 2 hrs prior to birth'

## 2023-06-19 NOTE — OB RN DELIVERY SUMMARY - NSSELHIDDEN_OBGYN_ALL_OB_FT
[NS_DeliveryAttending1_OBGYN_ALL_OB_FT:[id number]],[NS_DeliveryAssist1_OBGYN_ALL_OB_FT:[id number]],[NS_DeliveryRN_OBGYN_ALL_OB_FT:[id number]==]

## 2023-06-19 NOTE — OB PROVIDER H&P - HISTORY OF PRESENT ILLNESS
HPI: Pt is a 32 yo  @39w3d  presenting for scheduled rLTCS. Patient endorsing good fetal movement, denies loss of fluid, denies contractions, denies vaginal bleeding.     Prenatal Issues: uncomplicated  GBS negative   EFW: 3400g    OBHx:  chemical preg x3 (from IVF pregnancy) , , 2022 pLTCS for cat II, 7#14oz    Gyn Hx: denies     PMH: nephrolithiasis    SHx: CS x1    Psych: denies    Social: denies     Medications: PNV    Allergies: NKDA     Will Accept blood transfusion? yes

## 2023-06-19 NOTE — OB RN DELIVERY SUMMARY - NS_MATERNALMORBID_OBGYN_ALL_OB
Wili would like a call regarding her Losartin, Rosuvastatin and Tramadol.  I guess there are recalls, she stated that these medications are bad and she should not be taking.  Also stated a physician name and they want her to subscribe to their newsletter about these medications.  Please advise at [phone].  She also requested her Tramadol be sent to [facility] in [place] as [facility] is out of medication.  But call her first before it is refilled.   None

## 2023-06-19 NOTE — OB PROVIDER H&P - ATTENDING COMMENTS
agree w above  briefly, 32 yo  @39w3d  presenting for scheduled rLTCD.  uncomplicated pregnancy, GBS negative  plan of care reviewed. consent signed  nursing, anesthesia, peds aware  ritchie bonilla MD

## 2023-06-19 NOTE — OB RN PATIENT PROFILE - FUNCTIONAL ASSESSMENT - BASIC MOBILITY 6.
4-calculated by average/Not able to assess (calculate score using [facility] averaging method)

## 2023-06-19 NOTE — OB PROVIDER H&P - NSHPLABSRESULTS_GEN_ALL_CORE
Vital Signs Last 24 Hrs  T(C): --  T(F): --  HR: 85 (19 Jun 2023 09:36) (66 - 98)  BP: 124/86 (19 Jun 2023 09:18) (89/52 - 124/86)  BP(mean): --  RR: --  SpO2: 99% (19 Jun 2023 09:36) (96% - 99%)        Physical Exam:  Gen: NAD, AOx3  CV: RR  Resp: unlabored respirations  Abd: soft, NT, ND    FHT: 125, moderate variability, accels, no decels   [place]: uterine irritability

## 2023-06-20 LAB
BASOPHILS # BLD AUTO: 0.01 K/UL — SIGNIFICANT CHANGE UP (ref 0–0.2)
BASOPHILS NFR BLD AUTO: 0.2 % — SIGNIFICANT CHANGE UP (ref 0–2)
EOSINOPHIL # BLD AUTO: 0.08 K/UL — SIGNIFICANT CHANGE UP (ref 0–0.5)
EOSINOPHIL NFR BLD AUTO: 1.2 % — SIGNIFICANT CHANGE UP (ref 0–6)
HCT VFR BLD CALC: 32.3 % — LOW (ref 34.5–45)
HGB BLD-MCNC: 10.9 G/DL — LOW (ref 11.5–15.5)
IMM GRANULOCYTES NFR BLD AUTO: 0.5 % — SIGNIFICANT CHANGE UP (ref 0–0.9)
LYMPHOCYTES # BLD AUTO: 1.04 K/UL — SIGNIFICANT CHANGE UP (ref 1–3.3)
LYMPHOCYTES # BLD AUTO: 15.6 % — SIGNIFICANT CHANGE UP (ref 13–44)
MCHC RBC-ENTMCNC: 31.1 PG — SIGNIFICANT CHANGE UP (ref 27–34)
MCHC RBC-ENTMCNC: 33.7 GM/DL — SIGNIFICANT CHANGE UP (ref 32–36)
MCV RBC AUTO: 92.3 FL — SIGNIFICANT CHANGE UP (ref 80–100)
MONOCYTES # BLD AUTO: 0.51 K/UL — SIGNIFICANT CHANGE UP (ref 0–0.9)
MONOCYTES NFR BLD AUTO: 7.7 % — SIGNIFICANT CHANGE UP (ref 2–14)
NEUTROPHILS # BLD AUTO: 4.99 K/UL — SIGNIFICANT CHANGE UP (ref 1.8–7.4)
NEUTROPHILS NFR BLD AUTO: 74.8 % — SIGNIFICANT CHANGE UP (ref 43–77)
NRBC # BLD: 0 /100 WBCS — SIGNIFICANT CHANGE UP (ref 0–0)
PLATELET # BLD AUTO: 198 K/UL — SIGNIFICANT CHANGE UP (ref 150–400)
RBC # BLD: 3.5 M/UL — LOW (ref 3.8–5.2)
RBC # FLD: 13 % — SIGNIFICANT CHANGE UP (ref 10.3–14.5)
T PALLIDUM AB TITR SER: NEGATIVE — SIGNIFICANT CHANGE UP
WBC # BLD: 6.66 K/UL — SIGNIFICANT CHANGE UP (ref 3.8–10.5)
WBC # FLD AUTO: 6.66 K/UL — SIGNIFICANT CHANGE UP (ref 3.8–10.5)

## 2023-06-20 RX ORDER — IBUPROFEN 200 MG
600 TABLET ORAL EVERY 6 HOURS
Refills: 0 | Status: DISCONTINUED | OUTPATIENT
Start: 2023-06-20 | End: 2023-06-21

## 2023-06-20 RX ORDER — OXYCODONE HYDROCHLORIDE 5 MG/1
5 TABLET ORAL
Refills: 0 | Status: DISCONTINUED | OUTPATIENT
Start: 2023-06-20 | End: 2023-06-21

## 2023-06-20 RX ADMIN — HEPARIN SODIUM 5000 UNIT(S): 5000 INJECTION INTRAVENOUS; SUBCUTANEOUS at 06:22

## 2023-06-20 RX ADMIN — Medication 975 MILLIGRAM(S): at 12:50

## 2023-06-20 RX ADMIN — Medication 975 MILLIGRAM(S): at 05:49

## 2023-06-20 RX ADMIN — HEPARIN SODIUM 5000 UNIT(S): 5000 INJECTION INTRAVENOUS; SUBCUTANEOUS at 18:12

## 2023-06-20 RX ADMIN — OXYCODONE HYDROCHLORIDE 5 MILLIGRAM(S): 5 TABLET ORAL at 00:30

## 2023-06-20 RX ADMIN — Medication 975 MILLIGRAM(S): at 19:20

## 2023-06-20 RX ADMIN — Medication 600 MILLIGRAM(S): at 21:26

## 2023-06-20 RX ADMIN — SIMETHICONE 80 MILLIGRAM(S): 80 TABLET, CHEWABLE ORAL at 18:15

## 2023-06-20 RX ADMIN — Medication 600 MILLIGRAM(S): at 20:12

## 2023-06-20 RX ADMIN — Medication 975 MILLIGRAM(S): at 23:13

## 2023-06-20 RX ADMIN — OXYCODONE HYDROCHLORIDE 5 MILLIGRAM(S): 5 TABLET ORAL at 13:08

## 2023-06-20 RX ADMIN — Medication 600 MILLIGRAM(S): at 15:30

## 2023-06-20 RX ADMIN — OXYCODONE HYDROCHLORIDE 5 MILLIGRAM(S): 5 TABLET ORAL at 01:00

## 2023-06-20 RX ADMIN — Medication 30 MILLIGRAM(S): at 02:49

## 2023-06-20 RX ADMIN — Medication 600 MILLIGRAM(S): at 14:55

## 2023-06-20 RX ADMIN — Medication 975 MILLIGRAM(S): at 06:20

## 2023-06-20 RX ADMIN — Medication 975 MILLIGRAM(S): at 00:00

## 2023-06-20 RX ADMIN — Medication 30 MILLIGRAM(S): at 09:33

## 2023-06-20 RX ADMIN — Medication 30 MILLIGRAM(S): at 03:20

## 2023-06-20 RX ADMIN — Medication 975 MILLIGRAM(S): at 13:26

## 2023-06-20 RX ADMIN — Medication 30 MILLIGRAM(S): at 10:15

## 2023-06-20 RX ADMIN — Medication 975 MILLIGRAM(S): at 18:12

## 2023-06-20 NOTE — PROGRESS NOTE ADULT - ASSESSMENT
A/P: 32yo POD#1 s/p rLTCS.  Patient is stable and doing well post-operatively.    - Continue regular diet  - Increase ambulation  - Continue motrin, tylenol, oxycodone PRN for pain control.    - f/u AM CBC    Otilia Rob, PGY3

## 2023-06-21 ENCOUNTER — TRANSCRIPTION ENCOUNTER (OUTPATIENT)
Age: 33
End: 2023-06-21

## 2023-06-21 VITALS
DIASTOLIC BLOOD PRESSURE: 82 MMHG | TEMPERATURE: 98 F | OXYGEN SATURATION: 97 % | HEART RATE: 62 BPM | RESPIRATION RATE: 18 BRPM | SYSTOLIC BLOOD PRESSURE: 125 MMHG

## 2023-06-21 PROCEDURE — 59050 FETAL MONITOR W/REPORT: CPT

## 2023-06-21 PROCEDURE — 86900 BLOOD TYPING SEROLOGIC ABO: CPT

## 2023-06-21 PROCEDURE — 85025 COMPLETE CBC W/AUTO DIFF WBC: CPT

## 2023-06-21 PROCEDURE — 86769 SARS-COV-2 COVID-19 ANTIBODY: CPT

## 2023-06-21 PROCEDURE — 59025 FETAL NON-STRESS TEST: CPT

## 2023-06-21 PROCEDURE — 86780 TREPONEMA PALLIDUM: CPT

## 2023-06-21 PROCEDURE — 86901 BLOOD TYPING SEROLOGIC RH(D): CPT

## 2023-06-21 PROCEDURE — 86850 RBC ANTIBODY SCREEN: CPT

## 2023-06-21 RX ORDER — SIMETHICONE 80 MG/1
1 TABLET, CHEWABLE ORAL
Qty: 0 | Refills: 0 | DISCHARGE
Start: 2023-06-21

## 2023-06-21 RX ORDER — FAMOTIDINE 10 MG/ML
20 INJECTION INTRAVENOUS
Refills: 0 | Status: DISCONTINUED | OUTPATIENT
Start: 2023-06-21 | End: 2023-06-21

## 2023-06-21 RX ORDER — ACETAMINOPHEN 500 MG
3 TABLET ORAL
Qty: 0 | Refills: 0 | DISCHARGE
Start: 2023-06-21

## 2023-06-21 RX ORDER — IBUPROFEN 200 MG
1 TABLET ORAL
Qty: 0 | Refills: 0 | DISCHARGE
Start: 2023-06-21

## 2023-06-21 RX ADMIN — FAMOTIDINE 20 MILLIGRAM(S): 10 INJECTION INTRAVENOUS at 02:12

## 2023-06-21 RX ADMIN — Medication 600 MILLIGRAM(S): at 02:28

## 2023-06-21 RX ADMIN — Medication 975 MILLIGRAM(S): at 05:16

## 2023-06-21 RX ADMIN — Medication 975 MILLIGRAM(S): at 13:20

## 2023-06-21 RX ADMIN — Medication 600 MILLIGRAM(S): at 10:15

## 2023-06-21 RX ADMIN — Medication 600 MILLIGRAM(S): at 09:16

## 2023-06-21 RX ADMIN — Medication 975 MILLIGRAM(S): at 12:22

## 2023-06-21 RX ADMIN — SIMETHICONE 80 MILLIGRAM(S): 80 TABLET, CHEWABLE ORAL at 01:45

## 2023-06-21 RX ADMIN — Medication 600 MILLIGRAM(S): at 03:35

## 2023-06-21 RX ADMIN — HEPARIN SODIUM 5000 UNIT(S): 5000 INJECTION INTRAVENOUS; SUBCUTANEOUS at 05:17

## 2023-06-21 RX ADMIN — Medication 600 MILLIGRAM(S): at 14:59

## 2023-06-21 RX ADMIN — Medication 975 MILLIGRAM(S): at 00:10

## 2023-06-21 RX ADMIN — Medication 975 MILLIGRAM(S): at 06:20

## 2023-06-21 NOTE — DISCHARGE NOTE OB - CARE PROVIDER_API CALL
Priyanka Ayala  Maternal/Fetal Medicine  [address] [place] [address], [address]  [place] [address]  Phone: [phone]  Fax: [phone]  Follow Up Time:

## 2023-06-21 NOTE — DISCHARGE NOTE OB - MATERIALS PROVIDED
[facility] Screening Program/Bottle Feeding Log/Guide to Postpartum Care/[facility] Hearing Screen Program/Back To Sleep Handout/Shaken Baby Prevention Handout

## 2023-06-21 NOTE — DISCHARGE NOTE OB - CARE PLAN
Principal Discharge DX:	 delivery delivered  Assessment and plan of treatment:	nothing in the vagina (no sex, tampons, swimming or tub baths), no heavy lifting >12 lbs or exercise, follow up in office in 2 weeks   1

## 2023-06-21 NOTE — DISCHARGE NOTE OB - PATIENT PORTAL LINK FT
You can access the FollowMyHealth Patient Portal offered by [facility] by registering at the following website: [email or web address]. By joining [employer]’s FollowMyHealth portal, you will also be able to view your health information using other applications (apps) compatible with our system.

## 2023-06-21 NOTE — PROGRESS NOTE ADULT - SUBJECTIVE AND OBJECTIVE BOX
Day 1 of Anesthesia Pain Management Service    SUBJECTIVE:  Pain Scale Score:          [X] Refer to charted pain scores    THERAPY: Received PF spinal morphine as above    OBJECTIVE:    Sedation:        	[X] Alert	[ ] Drowsy	[ ] Arousable      [ ] Asleep       [ ] Unresponsive    Side Effects:	[X] None	[ ] Nausea	[ ] Vomiting         [ ] Pruritus  		[ ] Weakness            [ ] Numbness	          [ ] Other:    ASSESSMENT/ PLAN  [X] Patient transitioned to prn analgesics  [X] Pain management per primary service, pain service to sign off   [X]Documentation and Verification of current medications
R1 Progress Note    Patient seen and examined at bedside, no acute overnight events. No acute complaints, pain well controlled. Patient is ambulating and tolerating regular diet. Has passed flatus. Patient voiding independently. Denies CP, SOB, N/V. Bleeding minimal.    Vital Signs Last 24 Hours  T(C): 36.7 (06-21-23 @ 05:11), Max: 37.2 (06-20-23 @ 21:45)  HR: 62 (06-21-23 @ 05:11) (61 - 72)  BP: 125/82 (06-21-23 @ 05:11) (109/75 - 125/82)  RR: 18 (06-21-23 @ 05:11) (18 - 18)  SpO2: 97% (06-21-23 @ 05:11) (95% - 98%)    I&O's Summary    19 Jun 2023 07:01  -  20 Jun 2023 07:00  --------------------------------------------------------  IN: 3100 mL / OUT: 3261 mL / NET: -161 mL        Physical Exam:  General: NAD  Abdomen: Soft, non-tender, non-distended, fundus firm  Incision: Pfannenstiel incision CDI, subcuticular suture closure  Pelvic: Lochia wnl    Labs:    Blood Type: A Positive  Antibody Screen: Negative  RPR: Negative               10.9   6.66  )-----------( 198      ( 06-20 @ 07:10 )             32.3                12.8   5.89  )-----------( 248      ( 06-02 @ 16:50 )             37.3         MEDICATIONS  (STANDING):  acetaminophen     Tablet .. 975 milliGRAM(s) Oral <User Schedule>  diphtheria/tetanus/pertussis (acellular) Vaccine (Adacel) 0.5 milliLiter(s) IntraMuscular once  diphtheria/tetanus/pertussis (acellular) Vaccine (Adacel) 0.5 milliLiter(s) IntraMuscular once  famotidine    Tablet 20 milliGRAM(s) Oral two times a day  heparin   Injectable 5000 Unit(s) SubCutaneous every 12 hours  ibuprofen  Tablet. 600 milliGRAM(s) Oral every 6 hours  ibuprofen  Tablet. 600 milliGRAM(s) Oral every 6 hours  lactated ringers. 1000 milliLiter(s) (125 mL/Hr) IV Continuous <Continuous>  oxytocin Infusion 333.333 milliUNIT(s)/Min (1000 mL/Hr) IV Continuous <Continuous>    MEDICATIONS  (PRN):  diphenhydrAMINE 25 milliGRAM(s) Oral every 6 hours PRN Pruritus  diphenhydrAMINE 25 milliGRAM(s) Oral every 6 hours PRN Pruritus  lanolin Ointment 1 Application(s) Topical every 6 hours PRN Sore Nipples  magnesium hydroxide Suspension 30 milliLiter(s) Oral two times a day PRN Constipation  oxyCODONE    IR 5 milliGRAM(s) Oral once PRN Moderate to Severe Pain (4-10)  oxyCODONE    IR 5 milliGRAM(s) Oral every 3 hours PRN Moderate to Severe Pain (4-10)  oxyCODONE    IR 5 milliGRAM(s) Oral once PRN Moderate to Severe Pain (4-10)  oxyCODONE    IR 5 milliGRAM(s) Oral every 3 hours PRN Moderate to Severe Pain (4-10)  simethicone 80 milliGRAM(s) Chew every 4 hours PRN Gas  
Day 1 of Anesthesia Pain Management Service    SUBJECTIVE: Doing ok  Pain Scale Score:          [X] Refer to charted pain scores    THERAPY:    s/p    100 mcg PF morphine on 6\19\2023       MEDICATIONS  (STANDING):  acetaminophen     Tablet  975 milliGRAM(s) Oral <User Schedule>  diphtheria/tetanus/pertussis (acellular) Vaccine (Adacel) 0.5 milliLiter(s) IntraMuscular once  diphtheria/tetanus/pertussis (acellular) Vaccine (Adacel) 0.5 milliLiter(s) IntraMuscular once  heparin   Injectable 5000 Unit(s) SubCutaneous every 12 hours  ibuprofen  Tablet. 600 milliGRAM(s) Oral every 6 hours  ibuprofen  Tablet. 600 milliGRAM(s) Oral every 6 hours  lactated ringers. 1000 milliLiter(s) (125 mL/Hr) IV Continuous <Continuous>  morphine PF Spinal 0.1 milliGRAM(s) IntraThecal. once  oxytocin Infusion 333.333 milliUNIT(s)/Min (1000 mL/Hr) IV Continuous <Continuous>    MEDICATIONS  (PRN):  dexAMETHasone  Injectable 4 milliGRAM(s) IV Push every 6 hours PRN Nausea  diphenhydrAMINE 25 milliGRAM(s) Oral every 6 hours PRN Pruritus  diphenhydrAMINE 25 milliGRAM(s) Oral every 6 hours PRN Pruritus  lanolin Ointment 1 Application(s) Topical every 6 hours PRN Sore Nipples  magnesium hydroxide Suspension 30 milliLiter(s) Oral two times a day PRN Constipation  nalbuphine Injectable 2.5 milliGRAM(s) IV Push every 6 hours PRN Pruritus  naloxone Injectable 0.1 milliGRAM(s) IV Push every 3 minutes PRN For ANY of the following changes in patient status:  A. Breaths Per Minute LESS THAN 10, B. Oxygen saturation LESS THAN 90%, C. Sedation score of 6 for Stop After: 4 Times  ondansetron Injectable 4 milliGRAM(s) IV Push every 6 hours PRN Nausea  oxyCODONE    IR 5 milliGRAM(s) Oral once PRN Moderate to Severe Pain (4-10)  oxyCODONE    IR 5 milliGRAM(s) Oral every 3 hours PRN Moderate to Severe Pain (4-10)  oxyCODONE    IR 5 milliGRAM(s) Oral once PRN Moderate to Severe Pain (4-10)  oxyCODONE    IR 5 milliGRAM(s) Oral every 3 hours PRN Mild Pain (1 - 3)  oxyCODONE    IR 5 milliGRAM(s) Oral every 3 hours PRN Moderate to Severe Pain (4-10)  simethicone 80 milliGRAM(s) Chew every 4 hours PRN Gas      OBJECTIVE:    Sedation:        	[X] Alert	 [ ] Drowsy	[ ] Arousable      [ ] Asleep       [ ] Unresponsive    Side Effects:	[ ] None 	[ ] Nausea	[ ] Vomiting         [X ] Pruritus  		[ ] Weakness            [ ] Numbness	          [ ] Other:    Vital Signs Last 24 Hrs  T(C): 36.8 (20 Jun 2023 09:01), Max: 36.9 (19 Jun 2023 17:00)  T(F): 98.2 (20 Jun 2023 09:01), Max: 98.4 (19 Jun 2023 17:00)  HR: 61 (20 Jun 2023 09:01) (50 - 94)  BP: 112/76 (20 Jun 2023 09:01) (108/63 - 141/83)  BP(mean): 105 (19 Jun 2023 14:30) (80 - 105)  RR: 18 (20 Jun 2023 09:01) (18 - 20)  SpO2: 98% (20 Jun 2023 09:01) (96% - 100%)    Parameters below as of 20 Jun 2023 09:01  Patient On (Oxygen Delivery Method): room air        ASSESSMENT/ PLAN  [X] Patient to be transitioned to prn analgesics after 24 hours  [X] Pain management per primary service, pain service to sign off   [X]Documentation and Verification of current medications
OB Progress Note:  Delivery, POD#1    S: 32yo POD#1 s/p rLTCS. Pain well controlled, tolerating regular diet, not yet passing flatus, ambulating without difficulty, voiding spontaneously. Denies heavy vaginal bleeding, N/V, CP/SOB/lightheadedness/dizziness, headache, visual disturbances, epigastric pain.     O:   Vital Signs Last 24 Hrs  T(C): 36.6 (2023 05:30), Max: 36.9 (2023 17:00)  T(F): 97.9 (2023 05:30), Max: 98.4 (2023 17:00)  HR: 58 (2023 05:30) (50 - 98)  BP: 114/77 (2023 05:30) (89/52 - 141/83)  BP(mean): 105 (2023 14:30) (80 - 105)  RR: 18 (2023 05:30) (18 - 20)  SpO2: 97% (2023 05:30) (96% - 100%)    Parameters below as of 2023 05:30  Patient On (Oxygen Delivery Method): room air        Labs:  Blood type: A Positive  Rubella IgG: RPR: Negative          PE:  General: NAD  Abdomen: Mildly distended, appropriately tender, Fundus firm, incision c/d/i.  VE: No heavy vaginal bleeding  Extremities: No erythema, no pitting edema

## 2023-06-21 NOTE — DISCHARGE NOTE OB - MEDICATION SUMMARY - MEDICATIONS TO TAKE
I will START or STAY ON the medications listed below when I get home from the hospital:    ibuprofen 600 mg oral tablet  -- 1 tab(s) by mouth every 6 hours  -- Indication: For moderate pain    acetaminophen 325 mg oral tablet  -- 3 tab(s) by mouth every 6 hours as needed for  mild pain  -- Indication: For mild pain    Prenatal 1 oral capsule  -- orally once a day  -- Indication: For nutrition    simethicone 80 mg oral tablet, chewable  -- 1 tab(s) by mouth every 4 hours As needed Gas  -- Indication: For gas pain

## 2023-06-21 NOTE — DISCHARGE NOTE OB - PLAN OF CARE
nothing in the vagina (no sex, tampons, swimming or tub baths), no heavy lifting >12 lbs or exercise, follow up in office in 2 weeks

## 2023-06-21 NOTE — PROGRESS NOTE ADULT - ASSESSMENT
32y/o  POD#2 from rLTCS. . Overall, patient stable and recovering well postoperatively.    #Postpartum state  - Continue with po analgesia  - Increase ambulation  - Continue regular diet  - DVT prophylaxis with 5000u Heparin    Eileen Guerra  PGY-1

## 2023-06-21 NOTE — PROGRESS NOTE ADULT - ATTENDING COMMENTS
OB Attg:    Pt seen and assessed. I agree with above assessment and plan.   BENY Millard MD
32y/o  POD#2 from rLTCS doing well  routine post op and postpartum care  discharge home    Mattie Moody MD

## 2023-07-03 ENCOUNTER — APPOINTMENT (OUTPATIENT)
Dept: OBGYN | Facility: CLINIC | Age: 33
End: 2023-07-03
Payer: COMMERCIAL

## 2023-07-03 VITALS
WEIGHT: 121 LBS | HEART RATE: 87 BPM | OXYGEN SATURATION: 99 % | HEIGHT: 61 IN | RESPIRATION RATE: 17 BRPM | SYSTOLIC BLOOD PRESSURE: 108 MMHG | BODY MASS INDEX: 22.84 KG/M2 | DIASTOLIC BLOOD PRESSURE: 76 MMHG

## 2023-07-03 PROCEDURE — 0503F POSTPARTUM CARE VISIT: CPT

## 2023-07-03 NOTE — END OF VISIT
[FreeTextEntry3] : I Julian Hernandez, acted as a scribe on behalf of Dr. Romeo Elkins on 07/03/2023.\par \par All medical entries made by this scribe where at my Dr. Romeo Elkins, direction and personally dictated by me on 07/03/2023.  I have reviewed the chart and agree that the record accurately reflects my personal performance of the history, physical exam, assessment, and plan. I have also personally directed, reviewed and agreed with the chart.

## 2023-07-03 NOTE — HISTORY OF PRESENT ILLNESS
[Delivery Date: ___] : on [unfilled] [Female] : Delivery History: baby girl [Wt. ___] : weighing [unfilled] [None] : No associated symptoms are reported [Clean/Dry/Intact] : clean, dry and intact [Healed] : healed [Back to Normal] : is back to normal in size [Postpartum Follow Up] : postpartum follow up [Complications:___] : no complications [Primary C/S] : delivered by  section [Erythema] : not erythematous [Swelling] : not swollen [Dehiscence] : not dehisced [de-identified] : Dr. Ayala [de-identified] : Bottle feeding  [FreeTextEntry1] : 34 yo presents for postpartum visit. She is doing well and has no complaints. \par \par -s/p  on 2023\par -rto 4 weeks

## 2023-07-31 ENCOUNTER — APPOINTMENT (OUTPATIENT)
Dept: OBGYN | Facility: CLINIC | Age: 33
End: 2023-07-31
Payer: COMMERCIAL

## 2023-07-31 VITALS
BODY MASS INDEX: 22.47 KG/M2 | SYSTOLIC BLOOD PRESSURE: 100 MMHG | WEIGHT: 119 LBS | HEIGHT: 61 IN | DIASTOLIC BLOOD PRESSURE: 68 MMHG | HEART RATE: 101 BPM

## 2023-07-31 PROCEDURE — 0503F POSTPARTUM CARE VISIT: CPT

## 2023-07-31 NOTE — END OF VISIT
[FreeTextEntry3] : I, Cesario Forrest, acted as a scribe on behalf of Dr. Priyanka Ayala on 07/31/2023 .  All medical entries made by the scribe were at my, Dr. Priyanka Ayala's, direction and personally dictated by me on 07/31/2023. I have reviewed the chart and agree that the record accurately reflects my personal performance of the history, physical exam, assessment and plan. I have also personally directed, reviewed, and agreed with the chart

## 2023-07-31 NOTE — HISTORY OF PRESENT ILLNESS
[FreeTextEntry1] : Post-partum visit   Delivery details: uncomplicated rLTCD 23 by  for girl [id number]   issues: IVF Delivery complications: none  Current symptoms and complaints: Bottle-feeding, no issues stopping milk. Denies mastitis sxs. Denies ppd sxs. Reports normal bowel/bladder fxn. Lochia had stopped, denies return of menses.  Infant feeding: bottle  Exam: -breast exam nl -Incision well healed, C/D/I -no abdominal pain or tenderness, -speculum exam nl, perineum normal -BME nl, no CMT  L/E: Warm and well perfused  Assessment: -s/p rCD, stable  Plan: -inter pregnancy interval discussed. has more embryos. undecided about future childbearing -contraception options discussed, declines at this time, advised condoms -mastitis and PPD precautions reviewed -post-partum clearance  rto 3-4 months for annual or prn

## 2023-09-28 ENCOUNTER — APPOINTMENT (OUTPATIENT)
Dept: OBGYN | Facility: CLINIC | Age: 33
End: 2023-09-28
Payer: COMMERCIAL

## 2023-09-28 VITALS
SYSTOLIC BLOOD PRESSURE: 115 MMHG | HEART RATE: 88 BPM | HEIGHT: 61 IN | BODY MASS INDEX: 21.9 KG/M2 | WEIGHT: 116 LBS | OXYGEN SATURATION: 97 % | DIASTOLIC BLOOD PRESSURE: 78 MMHG

## 2023-09-28 DIAGNOSIS — R10.2 PELVIC AND PERINEAL PAIN: ICD-10-CM

## 2023-09-28 PROCEDURE — 99213 OFFICE O/P EST LOW 20 MIN: CPT

## 2023-09-28 RX ORDER — FLUCONAZOLE 150 MG/1
150 TABLET ORAL
Qty: 2 | Refills: 0 | Status: ACTIVE | COMMUNITY
Start: 2023-09-28 | End: 1900-01-01

## 2023-09-28 RX ORDER — CLOTRIMAZOLE AND BETAMETHASONE DIPROPIONATE 10; .5 MG/G; MG/G
1-0.05 CREAM TOPICAL 3 TIMES DAILY
Qty: 1 | Refills: 0 | Status: ACTIVE | COMMUNITY
Start: 2023-09-28 | End: 1900-01-01

## 2023-10-02 DIAGNOSIS — B37.9 CANDIDIASIS, UNSPECIFIED: ICD-10-CM

## 2023-10-02 LAB
BACTERIA UR CULT: NORMAL
CANDIDA VAG CYTO: DETECTED
G VAGINALIS+PREV SP MTYP VAG QL MICRO: DETECTED
T VAGINALIS VAG QL WET PREP: NOT DETECTED

## 2023-10-02 RX ORDER — FLUCONAZOLE 150 MG/1
150 TABLET ORAL
Qty: 1 | Refills: 0 | Status: ACTIVE | COMMUNITY
Start: 2023-10-02 | End: 1900-01-01

## 2023-10-02 RX ORDER — METRONIDAZOLE 7.5 MG/G
0.75 GEL VAGINAL
Qty: 1 | Refills: 0 | Status: ACTIVE | COMMUNITY
Start: 2023-10-02 | End: 1900-01-01

## 2023-10-13 ENCOUNTER — APPOINTMENT (OUTPATIENT)
Dept: OBGYN | Facility: CLINIC | Age: 33
End: 2023-10-13
Payer: COMMERCIAL

## 2023-10-13 VITALS
BODY MASS INDEX: 21.9 KG/M2 | WEIGHT: 116 LBS | DIASTOLIC BLOOD PRESSURE: 75 MMHG | HEIGHT: 61 IN | SYSTOLIC BLOOD PRESSURE: 109 MMHG

## 2023-10-13 DIAGNOSIS — N76.0 ACUTE VAGINITIS: ICD-10-CM

## 2023-10-13 PROCEDURE — 99214 OFFICE O/P EST MOD 30 MIN: CPT

## 2023-10-13 RX ORDER — FLUCONAZOLE 150 MG/1
150 TABLET ORAL
Qty: 2 | Refills: 0 | Status: ACTIVE | COMMUNITY
Start: 2023-10-13 | End: 1900-01-01

## 2023-10-13 RX ORDER — METRONIDAZOLE 7.5 MG/G
0.75 GEL VAGINAL
Qty: 1 | Refills: 0 | Status: ACTIVE | COMMUNITY
Start: 2023-10-13 | End: 1900-01-01

## 2023-10-15 LAB
CANDIDA VAG CYTO: NOT DETECTED
G VAGINALIS+PREV SP MTYP VAG QL MICRO: NOT DETECTED
T VAGINALIS VAG QL WET PREP: NOT DETECTED

## 2024-10-30 ENCOUNTER — APPOINTMENT (OUTPATIENT)
Dept: OBGYN | Facility: CLINIC | Age: 34
End: 2024-10-30
Payer: COMMERCIAL

## 2024-10-30 VITALS
HEIGHT: 61 IN | BODY MASS INDEX: 21.52 KG/M2 | DIASTOLIC BLOOD PRESSURE: 73 MMHG | WEIGHT: 114 LBS | SYSTOLIC BLOOD PRESSURE: 110 MMHG

## 2024-10-30 DIAGNOSIS — N85.A ISTHMOCELE: ICD-10-CM

## 2024-10-30 DIAGNOSIS — R10.2 PELVIC AND PERINEAL PAIN: ICD-10-CM

## 2024-10-30 DIAGNOSIS — O09.813 SUPERVISION OF PREGNANCY RESULTING FROM ASSISTED REPRODUCTIVE TECHNOLOGY, THIRD TRIMESTER: ICD-10-CM

## 2024-10-30 DIAGNOSIS — Z01.419 ENCOUNTER FOR GYNECOLOGICAL EXAMINATION (GENERAL) (ROUTINE) W/OUT ABNORMAL FINDINGS: ICD-10-CM

## 2024-10-30 DIAGNOSIS — O09.812 SUPERVISION OF PREGNANCY RESULTING FROM ASSISTED REPRODUCTIVE TECHNOLOGY, SECOND TRIMESTER: ICD-10-CM

## 2024-10-30 DIAGNOSIS — O34.219 MATERNAL CARE FOR UNSPECIFIED TYPE SCAR FROM PREVIOUS CESAREAN DELIVERY: ICD-10-CM

## 2024-10-30 DIAGNOSIS — O09.811 SUPERVISION OF PREGNANCY RESULTING FROM ASSISTED REPRODUCTIVE TECHNOLOGY, FIRST TRIMESTER: ICD-10-CM

## 2024-10-30 DIAGNOSIS — Z34.92 ENCOUNTER FOR SUPERVISION OF NORMAL PREGNANCY, UNSPECIFIED, SECOND TRIMESTER: ICD-10-CM

## 2024-10-30 PROCEDURE — 99459 PELVIC EXAMINATION: CPT

## 2024-10-30 PROCEDURE — 99395 PREV VISIT EST AGE 18-39: CPT

## 2024-11-01 LAB — HPV HIGH+LOW RISK DNA PNL CVX: NOT DETECTED

## 2024-11-03 LAB — CYTOLOGY CVX/VAG DOC THIN PREP: NORMAL

## 2025-04-08 NOTE — PRE-ANESTHESIA EVALUATION ADULT - NSANTHOSAYNRD_GEN_A_CORE
Lab Results   Component Value Date    COLOGUARD Positive (A) 09/19/2022     Was referred to [facility] for colonoscopy and do not see this has been completed  Referred to [facility] lab for colonoscopy    No. EROS screening performed.  STOP BANG Legend: 0-2 = LOW Risk; 3-4 = INTERMEDIATE Risk; 5-8 = HIGH Risk

## 2025-04-15 NOTE — DISCHARGE NOTE OB - BREASTFEEDING PROVIDES STABLE TEMPERATURE THROUGH SKIN TO SKIN CONTACT
[General Appearance - Alert] : alert [General Appearance - In No Acute Distress] : in no acute distress [General Appearance - Well Nourished] : well nourished [General Appearance - Well Developed] : well developed [General Appearance - Well-Appearing] : healthy appearing [Ankle Swelling On The Right] : mild [Delayed in the Right Toes] : capillary refills normal in right toes [Delayed in the Left Toes] : capillary refills normal in the left toes [] : normal strength/tone [de-identified] : ROM of ankle, subtalar, midtarsal, MPJ, IPJ are adequate and nontender bilateral 5/5 muscle power in inversion, eversion, dorsiflexion, plantarflexion bilateral [Skin Turgor] : normal skin turgor [Foot Ulcer] : no foot ulcer [FreeTextEntry1] : gross epicritic sensation to feet diminished, light touch sensation intact, sharp/dull sensation intact [Oriented To Time, Place, And Person] : oriented to person, place, and time [Impaired Insight] : insight and judgment were intact [Affect] : the affect was normal Statement Selected

## 2025-07-11 NOTE — PROGRESS NOTE ADULT - ASSESSMENT
Rx Refill Note  Requested Prescriptions     Pending Prescriptions Disp Refills    atorvastatin (LIPITOR) 20 MG tablet [Pharmacy Med Name: ATORVASTATIN 20 MG TABLET] 90 tablet 1     Sig: TAKE 1 TABLET BY MOUTH DAILY      Last office visit with prescribing clinician: 10/15/2024   Last telemedicine visit with prescribing clinician: Visit date not found   Next office visit with prescribing clinician: Visit date not found       Refill denied due to patient last seen  10/15/24 with no recent labs or OV.                Anabel Blackwell MA  07/11/25, 10:04 CDT   30y  at 25wa with R flank pain with radiation to RLQ, clinically consistent with nephrolithiasis. UA with trace blood and moderate calcium oxalate crystals. Renal sono with mild to moderate R hydronephrosis, right ureteral jet not visualized. Low concern for infected stone given pt is afebrile with WBC 10. Attempted to transition to PO pain medication with oxycodone overnight with minimal relief. Pt getting relief with Dilaudid 0.5mg PRN    #Nephrolithiasis   -s/p 2L LR, mIVF@250  -Strain all voids  -Continue Dilaudid 0.5mg PRN  -Consider urology eval today if no clinical improvement    #Fetal Well Being  -Cat 1 tracing, fetal status reassuring  -NST BID    #Maternal well-being  -Diet as tolerated  -Continue to monitor VS  -Continue PNV  -SCDs for DVT ppx     [facility] p[facility]3

## 2025-07-24 NOTE — OB PST NOTE - WAS YOUR LAST COVID-19 VACCINE GREATER THAN OR EQUAL TO TWO MONTHS AGO?
Important Message From Medicare ([facility])- 2nd copy of pages 1 & 2, explained/reviewed with patient prior to discharge.  Patient/representative verbalized understanding and signature obtained.  Copies placed in file for medical records.     Radha Bahena-Clinical   [phone]     
Yes
no